# Patient Record
Sex: FEMALE | Race: WHITE | Employment: FULL TIME | URBAN - METROPOLITAN AREA
[De-identification: names, ages, dates, MRNs, and addresses within clinical notes are randomized per-mention and may not be internally consistent; named-entity substitution may affect disease eponyms.]

---

## 2019-02-20 PROBLEM — A60.04 HERPES SIMPLEX VULVOVAGINITIS: Status: ACTIVE | Noted: 2019-02-20

## 2019-05-20 PROBLEM — N92.6 IRREGULAR BLEEDING: Status: ACTIVE | Noted: 2019-05-20

## 2019-05-20 PROBLEM — N83.201 RIGHT OVARIAN CYST: Status: ACTIVE | Noted: 2019-05-20

## 2021-03-05 ENCOUNTER — OFFICE VISIT (OUTPATIENT)
Dept: PRIMARY CARE CLINIC | Age: 26
End: 2021-03-05
Payer: COMMERCIAL

## 2021-03-05 VITALS
TEMPERATURE: 97.4 F | OXYGEN SATURATION: 98 % | DIASTOLIC BLOOD PRESSURE: 74 MMHG | SYSTOLIC BLOOD PRESSURE: 128 MMHG | HEART RATE: 113 BPM | BODY MASS INDEX: 21.9 KG/M2 | WEIGHT: 144 LBS

## 2021-03-05 DIAGNOSIS — F34.1 DYSTHYMIA: ICD-10-CM

## 2021-03-05 DIAGNOSIS — F41.1 GAD (GENERALIZED ANXIETY DISORDER): Primary | ICD-10-CM

## 2021-03-05 PROCEDURE — G8427 DOCREV CUR MEDS BY ELIG CLIN: HCPCS | Performed by: FAMILY MEDICINE

## 2021-03-05 PROCEDURE — 1036F TOBACCO NON-USER: CPT | Performed by: FAMILY MEDICINE

## 2021-03-05 PROCEDURE — G8484 FLU IMMUNIZE NO ADMIN: HCPCS | Performed by: FAMILY MEDICINE

## 2021-03-05 PROCEDURE — G8420 CALC BMI NORM PARAMETERS: HCPCS | Performed by: FAMILY MEDICINE

## 2021-03-05 PROCEDURE — 99203 OFFICE O/P NEW LOW 30 MIN: CPT | Performed by: FAMILY MEDICINE

## 2021-03-05 RX ORDER — DROSPIRENONE, ETHINYL ESTRADIOL AND LEVOMEFOLATE CALCIUM AND LEVOMEFOLATE CALCIUM 3-0.02(24)
KIT ORAL
COMMUNITY
End: 2021-03-17

## 2021-03-05 RX ORDER — CITALOPRAM 20 MG/1
TABLET ORAL
Qty: 30 TABLET | Refills: 0 | Status: SHIPPED
Start: 2021-03-05 | End: 2021-04-05

## 2021-03-05 SDOH — HEALTH STABILITY: MENTAL HEALTH: HOW OFTEN DO YOU HAVE A DRINK CONTAINING ALCOHOL?: 2-4 TIMES A MONTH

## 2021-03-05 SDOH — ECONOMIC STABILITY: TRANSPORTATION INSECURITY
IN THE PAST 12 MONTHS, HAS LACK OF TRANSPORTATION KEPT YOU FROM MEETINGS, WORK, OR FROM GETTING THINGS NEEDED FOR DAILY LIVING?: NOT ASKED

## 2021-03-05 ASSESSMENT — PATIENT HEALTH QUESTIONNAIRE - PHQ9
SUM OF ALL RESPONSES TO PHQ QUESTIONS 1-9: 0
SUM OF ALL RESPONSES TO PHQ QUESTIONS 1-9: 0
2. FEELING DOWN, DEPRESSED OR HOPELESS: 0
1. LITTLE INTEREST OR PLEASURE IN DOING THINGS: 0

## 2021-03-05 NOTE — PROGRESS NOTES
Stu Philippe, a female of 31 y.o. came to the office 3/5/2021. Patient Active Problem List   Diagnosis    Herpes simplex vulvovaginitis    Irregular bleeding    Right ovarian cyst          HPI anxiety: for past 18 months +. Tried different bcp's thinking that maybe cause of some anxiety but it didn't matter. Has done some therapy without much relief. Never on med. No Known Allergies    Current Outpatient Medications on File Prior to Visit   Medication Sig Dispense Refill    Drospiren-Eth Estrad-Levomefol 3-0.02-0.451 MG TABS Take by mouth      valACYclovir (VALTREX) 500 MG tablet Take 1 tablet by mouth 2 times daily 6 tablet 6     No current facility-administered medications on file prior to visit. Review of Systems   Constitutional: Positive for fatigue. Negative for activity change and appetite change. Psychiatric/Behavioral: Positive for agitation, dysphoric mood, sleep disturbance (staying ) and suicidal ideas. Negative for decreased concentration. The patient is nervous/anxious. Guilt: none   other review of systems reviewed and are negative    OBJECTIVE:  /74   Pulse 113   Temp 97.4 °F (36.3 °C)   Wt 144 lb (65.3 kg)   LMP 02/19/2021   SpO2 98%   BMI 21.90 kg/m²      Physical Exam  Constitutional:       General: She is not in acute distress. Eyes:      General: No scleral icterus. Conjunctiva/sclera: Conjunctivae normal.   Neck:      Musculoskeletal: Neck supple. Thyroid: No thyromegaly. Cardiovascular:      Rate and Rhythm: Normal rate and regular rhythm. Heart sounds: No murmur. Pulmonary:      Effort: Pulmonary effort is normal.      Breath sounds: Normal breath sounds. Lymphadenopathy:      Cervical: No cervical adenopathy. Skin:     General: Skin is warm and dry. Neurological:      Mental Status: She is alert and oriented to person, place, and time.          ASSESSMENT AND PLAN:    Richard Crespo was seen today for established new doctor, anxiety and insomnia. Diagnoses and all orders for this visit:    CRISTY (generalized anxiety disorder)  -     citalopram (CELEXA) 20 MG tablet; Take 0.5 tablets by mouth daily for 6 days, THEN 1 tablet daily for 24 days. Dysthymia    - continue exercising and journal ing.  - reviewed labs done from gyne which look ok. Return in about 1 month (around 4/5/2021), or if symptoms worsen or fail to improve.     Cookie Ribeiro, DO

## 2021-04-09 ENCOUNTER — OFFICE VISIT (OUTPATIENT)
Dept: PRIMARY CARE CLINIC | Age: 26
End: 2021-04-09
Payer: COMMERCIAL

## 2021-04-09 VITALS
DIASTOLIC BLOOD PRESSURE: 82 MMHG | TEMPERATURE: 97.7 F | WEIGHT: 141 LBS | HEART RATE: 87 BPM | BODY MASS INDEX: 21.44 KG/M2 | SYSTOLIC BLOOD PRESSURE: 122 MMHG | OXYGEN SATURATION: 98 %

## 2021-04-09 DIAGNOSIS — F34.1 DYSTHYMIA: ICD-10-CM

## 2021-04-09 DIAGNOSIS — F41.1 GAD (GENERALIZED ANXIETY DISORDER): Primary | ICD-10-CM

## 2021-04-09 PROCEDURE — 1036F TOBACCO NON-USER: CPT | Performed by: FAMILY MEDICINE

## 2021-04-09 PROCEDURE — G8420 CALC BMI NORM PARAMETERS: HCPCS | Performed by: FAMILY MEDICINE

## 2021-04-09 PROCEDURE — 99213 OFFICE O/P EST LOW 20 MIN: CPT | Performed by: FAMILY MEDICINE

## 2021-04-09 PROCEDURE — G8427 DOCREV CUR MEDS BY ELIG CLIN: HCPCS | Performed by: FAMILY MEDICINE

## 2021-04-09 RX ORDER — HYDROXYZINE PAMOATE 25 MG/1
25 CAPSULE ORAL 3 TIMES DAILY PRN
Qty: 30 CAPSULE | Refills: 1 | Status: ON HOLD
Start: 2021-04-09 | End: 2021-04-22 | Stop reason: HOSPADM

## 2021-04-09 SDOH — ECONOMIC STABILITY: INCOME INSECURITY: HOW HARD IS IT FOR YOU TO PAY FOR THE VERY BASICS LIKE FOOD, HOUSING, MEDICAL CARE, AND HEATING?: NOT HARD AT ALL

## 2021-04-09 SDOH — ECONOMIC STABILITY: TRANSPORTATION INSECURITY
IN THE PAST 12 MONTHS, HAS LACK OF TRANSPORTATION KEPT YOU FROM MEETINGS, WORK, OR FROM GETTING THINGS NEEDED FOR DAILY LIVING?: NO

## 2021-04-09 SDOH — ECONOMIC STABILITY: TRANSPORTATION INSECURITY
IN THE PAST 12 MONTHS, HAS THE LACK OF TRANSPORTATION KEPT YOU FROM MEDICAL APPOINTMENTS OR FROM GETTING MEDICATIONS?: NO

## 2021-04-09 SDOH — ECONOMIC STABILITY: FOOD INSECURITY: WITHIN THE PAST 12 MONTHS, YOU WORRIED THAT YOUR FOOD WOULD RUN OUT BEFORE YOU GOT MONEY TO BUY MORE.: NEVER TRUE

## 2021-04-09 NOTE — PROGRESS NOTES
Anne Cortez, a female of 31 y.o. came to the office 4/9/2021. Patient Active Problem List   Diagnosis    Herpes simplex vulvovaginitis    Irregular bleeding    Right ovarian cyst    CRISTY (generalized anxiety disorder)    Dysthymia          HPI CRISTY: anxiety not as frequent but when has it, there's more physical ie tight chest, sob. This occurs qod or situational.  Intense sens is 15-20 min's then feels exhausted after. 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 foing through a bad break up 1 month ago. Agitation and sleep better. Not as tearful as before. Feels 60 % better. Depression is much better. No Known Allergies    Current Outpatient Medications on File Prior to Visit   Medication Sig Dispense Refill    citalopram (CELEXA) 20 MG tablet Take 1 tablet by mouth daily 30 tablet 0    Drospiren-Eth Estrad-Levomefol 3-0.02-0.451 MG TABS TAKE 1 TABLET BY MOUTH DAILY 28 tablet 5    valACYclovir (VALTREX) 500 MG tablet Take 1 tablet by mouth 2 times daily 6 tablet 6     No current facility-administered medications on file prior to visit. Review of Systems   Constitutional: Negative for activity change, appetite change and fatigue. Psychiatric/Behavioral: Negative for agitation, decreased concentration, dysphoric mood, sleep disturbance and suicidal ideas. The patient is nervous/anxious. other review of systems reviewed and are negative    OBJECTIVE:  /82   Pulse 87   Temp 97.7 °F (36.5 °C)   Wt 141 lb (64 kg)   SpO2 98%   BMI 21.44 kg/m²      Physical Exam  Constitutional:       General: She is not in acute distress. Appearance: Normal appearance. She is not ill-appearing, toxic-appearing or diaphoretic. HENT:      Head: Normocephalic. Eyes:      General: No scleral icterus. Pulmonary:      Effort: Pulmonary effort is normal.   Neurological:      Mental Status: She is alert and oriented to person, place, and time.    Psychiatric:         Mood and Affect: Mood normal. ASSESSMENT AND PLAN:    Flakita Jenkins was seen today for anxiety. Diagnoses and all orders for this visit:    CRISTY (generalized anxiety disorder)  -     hydrOXYzine (VISTARIL) 25 MG capsule; Take 1 capsule by mouth 3 times daily as needed for Anxiety    Dysthymia    - continue current dose of Celexa  - add Vistaril 25 mg prn for anxiety. - call for refills. Return in about 3 months (around 7/9/2021), or if symptoms worsen or fail to improve.     Neeta Ribeiro, DO [de-identified] : Laterality: Right shoulder\par \par General: Alert and oriented x3.  In no acute distress.  Pleasant in nature with a normal affect.  No apparent respiratory distress. \par Erythema, Warmth, Rubor: Negative\par Swelling: Negative\par \par ROM:\par FF: 140 degrees\par ER: 65 degrees\par Abduction: 130 degrees\par IR: Normal\par IR behind back: L5\par \par Special Test:\par Empty Can Sign: Negative\par [place]'s Sign: Negative\par Mayo/Neer Sign: Negative\par Speed's Sign: Negative\par Yergason's Sign: Negative\par Apprehension/Relocation: Negative\par Sulcus Sign: Negative\par Cross Arm Adduction/Pain over AC-J: Negative\par Belly Press/Lift Off Behind Back: Negative\par \par Neurovascularly intact motor and sensory\par

## 2021-04-14 ENCOUNTER — HOSPITAL ENCOUNTER (INPATIENT)
Age: 26
LOS: 5 days | Discharge: PSYCHIATRIC OR SUBSTANCE USE FACILITY | DRG: 918 | End: 2021-04-19
Attending: EMERGENCY MEDICINE | Admitting: INTERNAL MEDICINE
Payer: COMMERCIAL

## 2021-04-14 ENCOUNTER — APPOINTMENT (OUTPATIENT)
Dept: GENERAL RADIOLOGY | Age: 26
DRG: 918 | End: 2021-04-14
Payer: COMMERCIAL

## 2021-04-14 DIAGNOSIS — T50.902A INTENTIONAL DRUG OVERDOSE, INITIAL ENCOUNTER (HCC): Primary | ICD-10-CM

## 2021-04-14 DIAGNOSIS — F32.2 CURRENT SEVERE EPISODE OF MAJOR DEPRESSIVE DISORDER WITHOUT PSYCHOTIC FEATURES, UNSPECIFIED WHETHER RECURRENT (HCC): ICD-10-CM

## 2021-04-14 PROBLEM — F41.1 GAD (GENERALIZED ANXIETY DISORDER): Chronic | Status: ACTIVE | Noted: 2021-04-09

## 2021-04-14 PROBLEM — F34.1 DYSTHYMIA: Chronic | Status: ACTIVE | Noted: 2021-04-09

## 2021-04-14 PROBLEM — A60.04 HERPES SIMPLEX VULVOVAGINITIS: Chronic | Status: ACTIVE | Noted: 2019-02-20

## 2021-04-14 LAB
ACETAMINOPHEN LEVEL: <5 MCG/ML (ref 10–30)
ACETAMINOPHEN LEVEL: <5 MCG/ML (ref 10–30)
ADENOVIRUS BY PCR: NOT DETECTED
ALBUMIN SERPL-MCNC: 4.2 G/DL (ref 3.5–5.2)
ALP BLD-CCNC: 35 U/L (ref 35–104)
ALT SERPL-CCNC: 12 U/L (ref 0–32)
AMPHETAMINE SCREEN, URINE: NOT DETECTED
ANION GAP SERPL CALCULATED.3IONS-SCNC: 11 MMOL/L (ref 7–16)
AST SERPL-CCNC: 13 U/L (ref 0–31)
BARBITURATE SCREEN URINE: NOT DETECTED
BASOPHILS ABSOLUTE: 0.05 E9/L (ref 0–0.2)
BASOPHILS RELATIVE PERCENT: 0.9 % (ref 0–2)
BENZODIAZEPINE SCREEN, URINE: NOT DETECTED
BILIRUB SERPL-MCNC: <0.2 MG/DL (ref 0–1.2)
BORDETELLA PARAPERTUSSIS BY PCR: NOT DETECTED
BORDETELLA PERTUSSIS BY PCR: NOT DETECTED
BUN BLDV-MCNC: 12 MG/DL (ref 6–20)
CALCIUM SERPL-MCNC: 9.6 MG/DL (ref 8.6–10.2)
CANNABINOID SCREEN URINE: NOT DETECTED
CHLAMYDOPHILIA PNEUMONIAE BY PCR: NOT DETECTED
CHLORIDE BLD-SCNC: 106 MMOL/L (ref 98–107)
CO2: 26 MMOL/L (ref 22–29)
COCAINE METABOLITE SCREEN URINE: NOT DETECTED
CORONAVIRUS 229E BY PCR: NOT DETECTED
CORONAVIRUS HKU1 BY PCR: NOT DETECTED
CORONAVIRUS NL63 BY PCR: NOT DETECTED
CORONAVIRUS OC43 BY PCR: NOT DETECTED
CREAT SERPL-MCNC: 0.9 MG/DL (ref 0.5–1)
EOSINOPHILS ABSOLUTE: 0.11 E9/L (ref 0.05–0.5)
EOSINOPHILS RELATIVE PERCENT: 2 % (ref 0–6)
ETHANOL: <10 MG/DL (ref 0–0.08)
ETHANOL: <10 MG/DL (ref 0–0.08)
FENTANYL SCREEN, URINE: NOT DETECTED
GFR AFRICAN AMERICAN: >60
GFR NON-AFRICAN AMERICAN: >60 ML/MIN/1.73
GLUCOSE BLD-MCNC: 86 MG/DL (ref 74–99)
HCG QUALITATIVE: NEGATIVE
HCT VFR BLD CALC: 34.2 % (ref 34–48)
HEMOGLOBIN: 11.2 G/DL (ref 11.5–15.5)
HUMAN METAPNEUMOVIRUS BY PCR: NOT DETECTED
HUMAN RHINOVIRUS/ENTEROVIRUS BY PCR: NOT DETECTED
IMMATURE GRANULOCYTES #: 0.02 E9/L
IMMATURE GRANULOCYTES %: 0.4 % (ref 0–5)
INFLUENZA A BY PCR: NOT DETECTED
INFLUENZA B BY PCR: NOT DETECTED
LYMPHOCYTES ABSOLUTE: 1.64 E9/L (ref 1.5–4)
LYMPHOCYTES RELATIVE PERCENT: 29.3 % (ref 20–42)
Lab: NORMAL
MAGNESIUM: 1.5 MG/DL (ref 1.6–2.6)
MCH RBC QN AUTO: 30.6 PG (ref 26–35)
MCHC RBC AUTO-ENTMCNC: 32.7 % (ref 32–34.5)
MCV RBC AUTO: 93.4 FL (ref 80–99.9)
METHADONE SCREEN, URINE: NOT DETECTED
MONOCYTES ABSOLUTE: 0.58 E9/L (ref 0.1–0.95)
MONOCYTES RELATIVE PERCENT: 10.4 % (ref 2–12)
MYCOPLASMA PNEUMONIAE BY PCR: NOT DETECTED
NEUTROPHILS ABSOLUTE: 3.2 E9/L (ref 1.8–7.3)
NEUTROPHILS RELATIVE PERCENT: 57 % (ref 43–80)
OPIATE SCREEN URINE: NOT DETECTED
OXYCODONE URINE: NOT DETECTED
PARAINFLUENZA VIRUS 1 BY PCR: NOT DETECTED
PARAINFLUENZA VIRUS 2 BY PCR: NOT DETECTED
PARAINFLUENZA VIRUS 3 BY PCR: NOT DETECTED
PARAINFLUENZA VIRUS 4 BY PCR: NOT DETECTED
PDW BLD-RTO: 12.6 FL (ref 11.5–15)
PHENCYCLIDINE SCREEN URINE: NOT DETECTED
PLATELET # BLD: 336 E9/L (ref 130–450)
PMV BLD AUTO: 9.8 FL (ref 7–12)
POTASSIUM SERPL-SCNC: 3.6 MMOL/L (ref 3.5–5)
RBC # BLD: 3.66 E12/L (ref 3.5–5.5)
RESPIRATORY SYNCYTIAL VIRUS BY PCR: NOT DETECTED
SALICYLATE, SERUM: <0.3 MG/DL (ref 0–30)
SALICYLATE, SERUM: <0.3 MG/DL (ref 0–30)
SARS-COV-2, PCR: NOT DETECTED
SODIUM BLD-SCNC: 143 MMOL/L (ref 132–146)
TOTAL PROTEIN: 6.7 G/DL (ref 6.4–8.3)
TRICYCLIC ANTIDEPRESSANTS SCREEN SERUM: NEGATIVE NG/ML
TRICYCLIC ANTIDEPRESSANTS SCREEN SERUM: NEGATIVE NG/ML
TROPONIN: <0.01 NG/ML (ref 0–0.03)
WBC # BLD: 5.6 E9/L (ref 4.5–11.5)

## 2021-04-14 PROCEDURE — 96366 THER/PROPH/DIAG IV INF ADDON: CPT

## 2021-04-14 PROCEDURE — 71045 X-RAY EXAM CHEST 1 VIEW: CPT

## 2021-04-14 PROCEDURE — 80307 DRUG TEST PRSMV CHEM ANLYZR: CPT

## 2021-04-14 PROCEDURE — 36415 COLL VENOUS BLD VENIPUNCTURE: CPT

## 2021-04-14 PROCEDURE — 84484 ASSAY OF TROPONIN QUANT: CPT

## 2021-04-14 PROCEDURE — G0378 HOSPITAL OBSERVATION PER HR: HCPCS

## 2021-04-14 PROCEDURE — 96375 TX/PRO/DX INJ NEW DRUG ADDON: CPT

## 2021-04-14 PROCEDURE — 80143 DRUG ASSAY ACETAMINOPHEN: CPT

## 2021-04-14 PROCEDURE — 85025 COMPLETE CBC W/AUTO DIFF WBC: CPT

## 2021-04-14 PROCEDURE — 2580000003 HC RX 258: Performed by: INTERNAL MEDICINE

## 2021-04-14 PROCEDURE — 2060000000 HC ICU INTERMEDIATE R&B

## 2021-04-14 PROCEDURE — 80053 COMPREHEN METABOLIC PANEL: CPT

## 2021-04-14 PROCEDURE — 83735 ASSAY OF MAGNESIUM: CPT

## 2021-04-14 PROCEDURE — 80179 DRUG ASSAY SALICYLATE: CPT

## 2021-04-14 PROCEDURE — 84703 CHORIONIC GONADOTROPIN ASSAY: CPT

## 2021-04-14 PROCEDURE — 82077 ASSAY SPEC XCP UR&BREATH IA: CPT

## 2021-04-14 PROCEDURE — 96372 THER/PROPH/DIAG INJ SC/IM: CPT

## 2021-04-14 PROCEDURE — 99285 EMERGENCY DEPT VISIT HI MDM: CPT

## 2021-04-14 PROCEDURE — 96376 TX/PRO/DX INJ SAME DRUG ADON: CPT

## 2021-04-14 PROCEDURE — 6360000002 HC RX W HCPCS: Performed by: INTERNAL MEDICINE

## 2021-04-14 PROCEDURE — 0202U NFCT DS 22 TRGT SARS-COV-2: CPT

## 2021-04-14 PROCEDURE — 2580000003 HC RX 258: Performed by: EMERGENCY MEDICINE

## 2021-04-14 PROCEDURE — 96365 THER/PROPH/DIAG IV INF INIT: CPT

## 2021-04-14 RX ORDER — POTASSIUM CHLORIDE 7.45 MG/ML
10 INJECTION INTRAVENOUS
Status: DISPENSED | OUTPATIENT
Start: 2021-04-14 | End: 2021-04-14

## 2021-04-14 RX ORDER — MAGNESIUM SULFATE IN WATER 40 MG/ML
2000 INJECTION, SOLUTION INTRAVENOUS ONCE
Status: COMPLETED | OUTPATIENT
Start: 2021-04-14 | End: 2021-04-14

## 2021-04-14 RX ORDER — SODIUM CHLORIDE 9 MG/ML
INJECTION, SOLUTION INTRAVENOUS CONTINUOUS
Status: DISCONTINUED | OUTPATIENT
Start: 2021-04-14 | End: 2021-04-17

## 2021-04-14 RX ORDER — SODIUM CHLORIDE 0.9 % (FLUSH) 0.9 %
5-40 SYRINGE (ML) INJECTION EVERY 12 HOURS SCHEDULED
Status: DISCONTINUED | OUTPATIENT
Start: 2021-04-14 | End: 2021-04-19 | Stop reason: HOSPADM

## 2021-04-14 RX ORDER — POLYETHYLENE GLYCOL 3350 17 G/17G
17 POWDER, FOR SOLUTION ORAL DAILY PRN
Status: DISCONTINUED | OUTPATIENT
Start: 2021-04-14 | End: 2021-04-19 | Stop reason: HOSPADM

## 2021-04-14 RX ORDER — SODIUM CHLORIDE 0.9 % (FLUSH) 0.9 %
5-40 SYRINGE (ML) INJECTION PRN
Status: DISCONTINUED | OUTPATIENT
Start: 2021-04-14 | End: 2021-04-19 | Stop reason: HOSPADM

## 2021-04-14 RX ORDER — 0.9 % SODIUM CHLORIDE 0.9 %
1000 INTRAVENOUS SOLUTION INTRAVENOUS ONCE
Status: COMPLETED | OUTPATIENT
Start: 2021-04-14 | End: 2021-04-14

## 2021-04-14 RX ORDER — SODIUM CHLORIDE 9 MG/ML
25 INJECTION, SOLUTION INTRAVENOUS PRN
Status: DISCONTINUED | OUTPATIENT
Start: 2021-04-14 | End: 2021-04-19 | Stop reason: HOSPADM

## 2021-04-14 RX ORDER — ACETAMINOPHEN 325 MG/1
650 TABLET ORAL EVERY 6 HOURS PRN
Status: DISCONTINUED | OUTPATIENT
Start: 2021-04-14 | End: 2021-04-19 | Stop reason: HOSPADM

## 2021-04-14 RX ORDER — ACETAMINOPHEN 650 MG/1
650 SUPPOSITORY RECTAL EVERY 6 HOURS PRN
Status: DISCONTINUED | OUTPATIENT
Start: 2021-04-14 | End: 2021-04-19 | Stop reason: HOSPADM

## 2021-04-14 RX ADMIN — POTASSIUM CHLORIDE 10 MEQ: 10 INJECTION, SOLUTION INTRAVENOUS at 18:28

## 2021-04-14 RX ADMIN — POTASSIUM CHLORIDE 10 MEQ: 10 INJECTION, SOLUTION INTRAVENOUS at 17:11

## 2021-04-14 RX ADMIN — SODIUM CHLORIDE 1000 ML: 9 INJECTION, SOLUTION INTRAVENOUS at 09:11

## 2021-04-14 RX ADMIN — ENOXAPARIN SODIUM 40 MG: 40 INJECTION SUBCUTANEOUS at 15:00

## 2021-04-14 RX ADMIN — Medication 10 ML: at 20:12

## 2021-04-14 RX ADMIN — MAGNESIUM SULFATE HEPTAHYDRATE 2000 MG: 40 INJECTION, SOLUTION INTRAVENOUS at 14:59

## 2021-04-14 RX ADMIN — SODIUM CHLORIDE: 9 INJECTION, SOLUTION INTRAVENOUS at 14:59

## 2021-04-14 ASSESSMENT — PAIN - FUNCTIONAL ASSESSMENT: PAIN_FUNCTIONAL_ASSESSMENT: 0-10

## 2021-04-14 ASSESSMENT — PAIN SCALES - GENERAL: PAINLEVEL_OUTOF10: 0

## 2021-04-14 NOTE — ED NOTES
Patient resting calmly on cart without new complaints. Constant observer at bedside.       Crispin Park RN  04/14/21 [id number]

## 2021-04-14 NOTE — FLOWSHEET NOTE
Patient a new admission to the unit. Patient is a suicide risk, belongings in the unit med room. Patient has two empty prescription bottles. One is Citalopram and the other is her brother's Risperidone. Other bottle is Vistaril for her anxiety. Patient is very sleepy but arousable. IVL #20 R AC.   Continuous sitter at bedside/

## 2021-04-14 NOTE — ED NOTES
Suicide and constant observation explained to the patient and her friend.      Bud López RN  04/14/21 3243

## 2021-04-14 NOTE — ED NOTES
Patient belongings secured in med room. Items reviewed by 2 nurses.       Aurora Bonds RN  04/14/21 4488

## 2021-04-14 NOTE — ED PROVIDER NOTES
HPI:  4/14/21,   Time: 9:15 AM EDT         Jovany Cesar is a 31 y.o. female presenting to the ED for drug overdose on citalopram and Risperdal, beginning approximately 4:30 AM ago. The complaint has been persistent, moderate in severity, and worsened by nothing. The patient's parents are out of town she is here with her friend. Friend relates that she has been stressed out over a number of issues recently including having to move back home from [facility] and she is concerned over Covid among other issues. Patient is cooperative    ROS:   Pertinent positives and negatives are stated within HPI, all other systems reviewed and are negative.  --------------------------------------------- PAST HISTORY ---------------------------------------------  Past Medical History:  has a past medical history of Bell's palsy and Herpes simplex. Past Surgical History:  has a past surgical history that includes [place] tooth extraction and knee surgery. Social History:  reports that she has never smoked. She has never used smokeless tobacco. She reports current alcohol use. She reports that she does not use drugs. Family History: family history includes Other in her brother; Seizures in her brother. The patients home medications have been reviewed. Allergies: Patient has no known allergies.     -------------------------------------------------- RESULTS -------------------------------------------------  All laboratory and radiology results have been personally reviewed by myself   LABS:  Results for orders placed or performed during the hospital encounter of 04/14/21   Respiratory Panel, Molecular, with COVID-19 (Restricted: peds pts or suitable admitted adults)    Specimen: Nasopharyngeal   Result Value Ref Range    Adenovirus by PCR Not Detected Not Detected    Bordetella parapertussis by PCR Not Detected Not Detected    Bordetella pertussis by PCR Not Detected Not Detected    Chlamydophilia pneumoniae by PCR Not Detected Not Detected    Coronavirus 229E by PCR Not Detected Not Detected    Coronavirus HKU1 by PCR Not Detected Not Detected    Coronavirus NL63 by PCR Not Detected Not Detected    Coronavirus OC43 by PCR Not Detected Not Detected    SARS-CoV-2, PCR Not Detected Not Detected    Human Metapneumovirus by PCR Not Detected Not Detected    Human Rhinovirus/Enterovirus by PCR Not Detected Not Detected    Influenza A by PCR Not Detected Not Detected    Influenza B by PCR Not Detected Not Detected    Mycoplasma pneumoniae by PCR Not Detected Not Detected    Parainfluenza Virus 1 by PCR Not Detected Not Detected    Parainfluenza Virus 2 by PCR Not Detected Not Detected    Parainfluenza Virus 3 by PCR Not Detected Not Detected    Parainfluenza Virus 4 by PCR Not Detected Not Detected    Respiratory Syncytial Virus by PCR Not Detected Not Detected   CBC Auto Differential   Result Value Ref Range    WBC 5.6 4.5 - 11.5 E9/L    RBC 3.66 3.50 - 5.50 E12/L    Hemoglobin 11.2 (L) 11.5 - 15.5 g/dL    Hematocrit 34.2 34.0 - 48.0 %    MCV 93.4 80.0 - 99.9 fL    MCH 30.6 26.0 - 35.0 pg    MCHC 32.7 32.0 - 34.5 %    RDW 12.6 11.5 - 15.0 fL    Platelets 968 700 - 949 E9/L    MPV 9.8 7.0 - 12.0 fL    Neutrophils % 57.0 43.0 - 80.0 %    Immature Granulocytes % 0.4 0.0 - 5.0 %    Lymphocytes % 29.3 20.0 - 42.0 %    Monocytes % 10.4 2.0 - 12.0 %    Eosinophils % 2.0 0.0 - 6.0 %    Basophils % 0.9 0.0 - 2.0 %    Neutrophils Absolute 3.20 1.80 - 7.30 E9/L    Immature Granulocytes # 0.02 E9/L    Lymphocytes Absolute 1.64 1.50 - 4.00 E9/L    Monocytes Absolute 0.58 0.10 - 0.95 E9/L    Eosinophils Absolute 0.11 0.05 - 0.50 E9/L    Basophils Absolute 0.05 0.00 - 0.20 E9/L   Comprehensive Metabolic Panel   Result Value Ref Range    Sodium 143 132 - 146 mmol/L    Potassium 3.6 3.5 - 5.0 mmol/L    Chloride 106 98 - 107 mmol/L    CO2 26 22 - 29 mmol/L    Anion Gap 11 7 - 16 mmol/L    Glucose 86 74 - 99 mg/dL    BUN 12 6 - 20 mg/dL    CREATININE 0.9 0.5 - 1.0 mg/dL    GFR Non-African American >60 >=60 mL/min/1.73    GFR African American >60     Calcium 9.6 8.6 - 10.2 mg/dL    Total Protein 6.7 6.4 - 8.3 g/dL    Albumin 4.2 3.5 - 5.2 g/dL    Total Bilirubin <0.2 0.0 - 1.2 mg/dL    Alkaline Phosphatase 35 35 - 104 U/L    ALT 12 0 - 32 U/L    AST 13 0 - 31 U/L   Troponin   Result Value Ref Range    Troponin <0.01 0.00 - 0.03 ng/mL   Serum Drug Screen   Result Value Ref Range    Ethanol Lvl <10 mg/dL    Acetaminophen Level <5.0 (L) 10.0 - 22.9 mcg/mL    Salicylate, Serum <1.6 0.0 - 30.0 mg/dL   HCG Qualitative, Serum   Result Value Ref Range    hCG Qual NEGATIVE NEGATIVE   Magnesium   Result Value Ref Range    Magnesium 1.5 (L) 1.6 - 2.6 mg/dL       RADIOLOGY:  Interpreted by Radiologist.  XR CHEST PORTABLE   Final Result   No acute process. ------------------------- NURSING NOTES AND VITALS REVIEWED ---------------------------   The nursing notes within the ED encounter and vital signs as below have been reviewed. /62   Pulse 70   Temp 97.7 °F (36.5 °C) (Temporal)   Resp 16   Ht 5' 8\" (1.727 m)   Wt 145 lb (65.8 kg)   LMP 03/18/2021 (Approximate)   SpO2 99%   BMI 22.05 kg/m²   Oxygen Saturation Interpretation: Normal      ---------------------------------------------------PHYSICAL EXAM--------------------------------------      Constitutional/General: Awake but sleepy is able to verbalize and respond to verbal   head: NC/AT  Eyes: PERRL, EOMI  Mouth: Oropharynx clear, handling secretions, no trismus  Neck: Supple, full ROM, no meningeal signs  Pulmonary: Lungs clear to auscultation bilaterally, no wheezes, rales, or rhonchi. Not in respiratory distress  Cardiovascular:  Regular rate and rhythm, no murmurs, gallops, or rubs. 2+ distal pulses  Abdomen: Soft, non tender, non distended,   Extremities: Moves all extremities x 4.  Warm and well perfused  Skin: warm and dry without rash  Neurologic: GCS 15, awake moving all extremities no focal deficits. Extraocular muscles are intact  Psych: Affect flat appears depressed      ------------------------------ ED COURSE/MEDICAL DECISION MAKING----------------------  Medications   0.9 % sodium chloride bolus (0 mLs Intravenous Stopped 4/14/21 1103)         Medical Decision Making: Will check basic lab work including urine and serum drug screens. We will consult poison control. Patient will need to be admitted to monitored bed for 24 hours and will need psychiatric consult        An extensive consultation with poison control regarding this patient and her overdoses. Celexa has a half-life of 30 to 36 hours and concern for prolonged QT and the patient at this time does have borderline and prolonged QT so patient will need to be monitored at least for today or possibly 2 days. Consult internal medicine, Dr. Shashank Hook is covering he accepts the patient for admission to [facility] intermediate bed. Patient was reexamined at approximately 11:30 AM and is awake but sleepy verbalizing response to verbal vital signs are stable heart rates in the 70s blood pressure is good no respiratory distress. Awaiting transfer to [facility]. Please note that the withdrawal or failure to initiate urgent interventions for this patient would likely result in a life threatening deterioration or permanent disability. Accordingly this patient received 0 minutes of critical care time, excluding separately billable procedures. Counseling: The emergency provider has spoken with the patient and discussed todays results, in addition to providing specific details for the plan of care and counseling regarding the diagnosis and prognosis. Questions are answered at this time and they are agreeable with the plan.      --------------------------------- IMPRESSION AND DISPOSITION ---------------------------------    IMPRESSION  1. Intentional drug overdose, initial encounter ([facility]    2.  Current severe episode of major depressive disorder without psychotic features, unspecified whether recurrent ([facility] [address] [facility]        DISPOSITION  Disposition: Admit to telemetry/intermediate at [facility]  Patient condition is serious                  Claudia Cox MD  04/14/21 8630

## 2021-04-14 NOTE — H&P
[address]                  [address] [facility],  [address]                              HISTORY AND PHYSICAL    PATIENT NAME: Dave Kat                    :        1995  MED REC NO:   [id number]                            ROOM:       5410  ACCOUNT NO:   [de-identified]                           ADMIT DATE: 2021  PROVIDER:     Rena Cutler DO    PRIMARY CARE PHYSICIAN:  Qu[place]. CHIEF COMPLAINT:  Drug overdose. HISTORY OF PRESENT ILLNESS:  The patient is a 69-year-old   female who presented to the emergency room after she took an unknown  amount of Celexa, hydroxyzine, and Risperdal.  She was seen in the  emergency room and admitted for further evaluation and treatment. PAST MEDICAL HISTORY:  Anxiety disorder, dysthymia, herpes simplex  vulvovaginitis. MEDICATIONS PRIOR TO ADMISSION:  Vistaril, Celexa, birth control pill,  Valtrex. REVIEW OF SYSTEMS:  Remarkable for above-stated chief complaint. ALLERGIES:  None known. SOCIAL HISTORY:  No tobacco.  Occasional alcohol. PAST SURGICAL HISTORY:  [place] tooth extraction, knee surgery. PHYSICAL EXAMINATION:  GENERAL APPEARANCE:  Physical exam reveals a 69-year-old   female who is responsive to verbal stimuli, cooperative and a poor  historian. She is lethargic. VITAL SIGNS:  On admission, temperature 97.5, pulse 98, respirations 14,  blood pressure 112/74. HEAD:  Normocephalic. Atraumatic. EYES:  Pupils equal and reactive to light. Extraocular muscles intact. Fundi not well visualized. NOSE:  No obstruction, polyp, or discharge noted. MOUTH:  Mucosa without lesion. Pharynx noninjected with exudate. NECK:  Supple. No JVD. No thyromegaly. No carotid bruits. HEART:  Regular rate and rhythm without murmur. LUNGS:  Clear to auscultation bilaterally. ABDOMEN:  Positive bowel sounds. Soft, nontender. No rebound. No  guarding.   No hepatosplenomegaly. No masses. BACK:  Intact without lesion. EXTREMITIES:  Without edema. LYMPH NODES:  No adenopathy noted. SKIN:  Without rash or lesion. IMPRESSION:  Intentional drug overdose, history of generalized anxiety  disorder, dysthymia, herpes simplex vulvovaginitis. PLAN:  Admit. Replace K. Replace magnesium. Suicide precautions. IV  hydration. Psychiatry to see. Serum and urine toxicology screen. Cardiac telemetry. Discharge plan to home versus inpatient psychiatric  unit as per the patient's progress.         Reanna Romano DO    D: 04/14/2021 14:39:17       T: 04/14/2021 14:46:37     [id number]  Job#: [id number]     Doc#: [id number]    CC:

## 2021-04-15 PROBLEM — F32.A DEPRESSION WITH SUICIDAL IDEATION: Status: ACTIVE | Noted: 2021-04-09

## 2021-04-15 PROBLEM — R45.851 DEPRESSION WITH SUICIDAL IDEATION: Status: ACTIVE | Noted: 2021-04-09

## 2021-04-15 LAB
ALBUMIN SERPL-MCNC: 3.6 G/DL (ref 3.5–5.2)
ALP BLD-CCNC: 27 U/L (ref 35–104)
ALT SERPL-CCNC: 8 U/L (ref 0–32)
ANION GAP SERPL CALCULATED.3IONS-SCNC: 6 MMOL/L (ref 7–16)
AST SERPL-CCNC: 10 U/L (ref 0–31)
BILIRUB SERPL-MCNC: 0.4 MG/DL (ref 0–1.2)
BUN BLDV-MCNC: 7 MG/DL (ref 6–20)
CALCIUM SERPL-MCNC: 8.2 MG/DL (ref 8.6–10.2)
CHLORIDE BLD-SCNC: 112 MMOL/L (ref 98–107)
CO2: 23 MMOL/L (ref 22–29)
CREAT SERPL-MCNC: 0.7 MG/DL (ref 0.5–1)
GFR AFRICAN AMERICAN: >60
GFR NON-AFRICAN AMERICAN: >60 ML/MIN/1.73
GLUCOSE BLD-MCNC: 80 MG/DL (ref 74–99)
HCT VFR BLD CALC: 31.9 % (ref 34–48)
HEMOGLOBIN: 10 G/DL (ref 11.5–15.5)
MAGNESIUM: 2 MG/DL (ref 1.6–2.6)
MCH RBC QN AUTO: 30.5 PG (ref 26–35)
MCHC RBC AUTO-ENTMCNC: 31.3 % (ref 32–34.5)
MCV RBC AUTO: 97.3 FL (ref 80–99.9)
PDW BLD-RTO: 12.9 FL (ref 11.5–15)
PLATELET # BLD: 275 E9/L (ref 130–450)
PMV BLD AUTO: 9.9 FL (ref 7–12)
POTASSIUM SERPL-SCNC: 4.4 MMOL/L (ref 3.5–5)
RBC # BLD: 3.28 E12/L (ref 3.5–5.5)
SODIUM BLD-SCNC: 141 MMOL/L (ref 132–146)
TOTAL PROTEIN: 5.4 G/DL (ref 6.4–8.3)
WBC # BLD: 5 E9/L (ref 4.5–11.5)

## 2021-04-15 PROCEDURE — 83735 ASSAY OF MAGNESIUM: CPT

## 2021-04-15 PROCEDURE — 6370000000 HC RX 637 (ALT 250 FOR IP): Performed by: INTERNAL MEDICINE

## 2021-04-15 PROCEDURE — G0378 HOSPITAL OBSERVATION PER HR: HCPCS

## 2021-04-15 PROCEDURE — 6360000002 HC RX W HCPCS: Performed by: INTERNAL MEDICINE

## 2021-04-15 PROCEDURE — 99231 SBSQ HOSP IP/OBS SF/LOW 25: CPT | Performed by: NURSE PRACTITIONER

## 2021-04-15 PROCEDURE — 2060000000 HC ICU INTERMEDIATE R&B

## 2021-04-15 PROCEDURE — 96372 THER/PROPH/DIAG INJ SC/IM: CPT

## 2021-04-15 PROCEDURE — 2580000003 HC RX 258: Performed by: INTERNAL MEDICINE

## 2021-04-15 PROCEDURE — 80053 COMPREHEN METABOLIC PANEL: CPT

## 2021-04-15 PROCEDURE — 85027 COMPLETE CBC AUTOMATED: CPT

## 2021-04-15 PROCEDURE — 36415 COLL VENOUS BLD VENIPUNCTURE: CPT

## 2021-04-15 RX ORDER — CETIRIZINE HYDROCHLORIDE 10 MG/1
10 TABLET ORAL DAILY
Status: DISCONTINUED | OUTPATIENT
Start: 2021-04-15 | End: 2021-04-19 | Stop reason: HOSPADM

## 2021-04-15 RX ADMIN — Medication 10 ML: at 20:01

## 2021-04-15 RX ADMIN — CETIRIZINE HYDROCHLORIDE 10 MG: 10 TABLET, FILM COATED ORAL at 15:20

## 2021-04-15 RX ADMIN — Medication 10 ML: at 12:58

## 2021-04-15 RX ADMIN — ENOXAPARIN SODIUM 40 MG: 40 INJECTION SUBCUTANEOUS at 12:02

## 2021-04-15 RX ADMIN — SODIUM CHLORIDE 25 ML: 9 INJECTION, SOLUTION INTRAVENOUS at 13:29

## 2021-04-15 ASSESSMENT — PAIN SCALES - GENERAL
PAINLEVEL_OUTOF10: 0
PAINLEVEL_OUTOF10: 0

## 2021-04-15 NOTE — CONSULTS
Reason for consult: \"Suicidal\"    Consulting Physician: Dr Osmel Gomez      HPI:   Greg Coyle is a 31 y.o. female presenting to the ED for drug overdose on citalopram and Risperdal, beginning approximately 4:30 AM ago. The complaint has been persistent, moderate in severity, and worsened by nothing. The patient's parents are out of town she is here with her friend. Friend relates that she has been stressed out over a number of issues recently including having to move back home from [facility] and she is concerned over Covid among other issues. ( End ED note)    Upon assessment today the patient is bright and energetic. She verbally consents to speak with me. That she has never had a suicide attempt before in the past feeling that this was an impulsive act. She is able to tell me that she has been through some stressful times lately. And that she is experience stress in the past but yara acted out impulsively before. He denies auditory or visual hallucinations there is no overt or covert signs of psychosis or paranoia. 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 She denies a history of cutting, denies feeling easily abandoned. Tells me that her PCP has been managing her depression in the community. She endorses that she took an unknown quantity of her or her medications, with intent to harm herself. She states that she no longer feels suicidal.  She is agreeable to come to [facility] upon discharge for psychiatric intervention and treatment. MSE:   Mental status examination reveals a 29-year-old  female in hospital attire with average hygiene and average grooming is superficially forthcoming and cooperative for assessment. Psychomotor reveals no agitation retardation or abnormal posture.   Speech is clear, well articulated she is able to answer questions with relevance and there is no delayed or long latency of response. Eye contact is good throughout assessment. Mood is \"depressed. \"  Affect is bright, energetic and incongruent with stated mood. Thought process is linear and goal-directed there is no looseness of associations there are no flight of ideas. Thought content is devoid of auditory or visual hallucinations there are no overt or covert signs of psychosis or paranoia. She states that she is no longer feeling suicidal however endorses that she was suicidal when she took the overdose. Devoid of homicidal ideation intent or plan. Cognitive function appears to be below baseline due to illness. Memory testing not completed during this encounter. Insight judgment and impulse control are poor. She is alert oriented person place time and situation and able to recount events leading to her hospitalization. DX:     Depression with suicidal ideation    Plan/Recommendations: The patient case, plan of care and recommendations has been discussed with Dr Chele Álvarez and the collaborative psychiatric care team.     Patient to come to [facility] once medically stabilized. Please call [facility] at extension [phone] for admission once patient is medically stable. Thank you for the consult, call with questions.

## 2021-04-15 NOTE — PLAN OF CARE
Problem: Skin Integrity:  Goal: Will show no infection signs and symptoms  Description: Will show no infection signs and symptoms  Outcome: Met This Shift  Goal: Absence of new skin breakdown  Description: Absence of new skin breakdown  Outcome: Met This Shift     Problem: Suicide risk  Goal: Provide patient with safe environment  Description: Provide patient with safe environment  Outcome: Met This Shift

## 2021-04-15 NOTE — CARE COORDINATION
Pt reports from home, independent of all ADL; verified PCP and pharmacy ([facility] [address]); reports no history of O/P treatment or counseling, no history of drug or alcohol abuse however, I did inform pt that peer recovery was notified d/t the intentional drug overdose. Discharge plan is to return home when medically stable.

## 2021-04-15 NOTE — PROGRESS NOTES
04/14/2021    CREATININE 0.9 04/14/2021    GFRAA >60 04/14/2021    LABGLOM >60 04/14/2021    LABGLOM >90 02/29/2016    GLUCOSE 86 04/14/2021    PROT 6.7 04/14/2021    LABALBU 4.2 04/14/2021    CALCIUM 9.6 04/14/2021    BILITOT <0.2 04/14/2021    ALKPHOS 35 04/14/2021    AST 13 04/14/2021    ALT 12 04/14/2021     Warfarin PT/INR:  No results found for: INR, PROTIME    Assessment:    Active Problems:    Herpes simplex vulvovaginitis    CRISTY (generalized anxiety disorder)    Dysthymia    Drug overdose, intentional self-harm, initial encounter ([facility] [address]  Resolved Problems:    * No resolved hospital problems.  *  prolonged QT    Plan:  Cont cardiac telemetry await am lab psych to see repeat ekg in am        Heather Velez  6:42 AM  4/15/2021

## 2021-04-16 LAB
EKG ATRIAL RATE: 71 BPM
EKG P AXIS: 57 DEGREES
EKG P-R INTERVAL: 150 MS
EKG Q-T INTERVAL: 438 MS
EKG QRS DURATION: 70 MS
EKG QTC CALCULATION (BAZETT): 475 MS
EKG R AXIS: 86 DEGREES
EKG T AXIS: 51 DEGREES
EKG VENTRICULAR RATE: 71 BPM

## 2021-04-16 PROCEDURE — 2060000000 HC ICU INTERMEDIATE R&B

## 2021-04-16 PROCEDURE — 6360000002 HC RX W HCPCS: Performed by: INTERNAL MEDICINE

## 2021-04-16 PROCEDURE — 93010 ELECTROCARDIOGRAM REPORT: CPT | Performed by: INTERNAL MEDICINE

## 2021-04-16 PROCEDURE — 96372 THER/PROPH/DIAG INJ SC/IM: CPT

## 2021-04-16 PROCEDURE — 93005 ELECTROCARDIOGRAM TRACING: CPT | Performed by: INTERNAL MEDICINE

## 2021-04-16 PROCEDURE — G0378 HOSPITAL OBSERVATION PER HR: HCPCS

## 2021-04-16 PROCEDURE — 2580000003 HC RX 258: Performed by: INTERNAL MEDICINE

## 2021-04-16 PROCEDURE — 6370000000 HC RX 637 (ALT 250 FOR IP): Performed by: INTERNAL MEDICINE

## 2021-04-16 RX ORDER — ACETAMINOPHEN 325 MG/1
650 TABLET ORAL EVERY 6 HOURS PRN
Status: CANCELLED | OUTPATIENT
Start: 2021-04-16

## 2021-04-16 RX ORDER — CETIRIZINE HYDROCHLORIDE 10 MG/1
10 TABLET ORAL DAILY PRN
Status: CANCELLED | OUTPATIENT
Start: 2021-04-16

## 2021-04-16 RX ORDER — ACETAMINOPHEN 650 MG/1
650 SUPPOSITORY RECTAL EVERY 6 HOURS PRN
Status: CANCELLED | OUTPATIENT
Start: 2021-04-16

## 2021-04-16 RX ORDER — POLYETHYLENE GLYCOL 3350 17 G/17G
17 POWDER, FOR SOLUTION ORAL DAILY PRN
Status: CANCELLED | OUTPATIENT
Start: 2021-04-16

## 2021-04-16 RX ADMIN — Medication 10 ML: at 09:57

## 2021-04-16 RX ADMIN — ENOXAPARIN SODIUM 40 MG: 40 INJECTION SUBCUTANEOUS at 09:07

## 2021-04-16 RX ADMIN — SODIUM CHLORIDE: 9 INJECTION, SOLUTION INTRAVENOUS at 02:19

## 2021-04-16 RX ADMIN — CETIRIZINE HYDROCHLORIDE 10 MG: 10 TABLET, FILM COATED ORAL at 09:37

## 2021-04-16 ASSESSMENT — PAIN SCALES - GENERAL: PAINLEVEL_OUTOF10: 0

## 2021-04-16 NOTE — CARE COORDINATION
This note will not be viewable in [facility] for the following reason(s). Suspected substance abuse disorder. Peer Recovery Support Note    Name: Shawn David  Date: 4/16/2021    Chief Complaint   Patient presents with    Drug Overdose     Took overdose at 430 am. citalopram and risperidone       Peer Support met with patient.   [x] Support and education provided  [x] Resources provided   [x] Treatment referral: [facility]  [] Other:   [] Patient declined peer recovery services     Referred By: Rosy Heller ()  Notes:     Signed: Geraldo Guzmán, 4/16/2021

## 2021-04-16 NOTE — ED NOTES
[facility] aware of patient need for psych bed. SW discussed case with Psych NP John Medina. Patient to be pink slipped for safety/stabilization.      Jarrod Sosa, MSW, LSW  04/16/21 6739

## 2021-04-16 NOTE — PROGRESS NOTES
Hospital Medicine    Subjective:  Pt more alert conversive today    Current Facility-Administered Medications:     cetirizine (ZYRTEC) tablet 10 mg, 10 mg, Oral, Daily, Clent Evelyn Malmer, DO, 10 mg at 04/16/21 2638    sodium chloride flush 0.9 % injection 5-40 mL, 5-40 mL, Intravenous, 2 times per day, Wilsey Reap, DO, 10 mL at 04/16/21 0957    sodium chloride flush 0.9 % injection 5-40 mL, 5-40 mL, Intravenous, PRN, Clent Evelyn Malmer, DO    0.9 % sodium chloride infusion, 25 mL, Intravenous, PRN, Clent Evelyn Malmer, DO, Last Rate: 100 mL/hr at 04/15/21 1329, 25 mL at 04/15/21 1329    enoxaparin (LOVENOX) injection 40 mg, 40 mg, Subcutaneous, Daily, Clent Evelyn Malmer, DO, 40 mg at 04/16/21 0907    polyethylene glycol (GLYCOLAX) packet 17 g, 17 g, Oral, Daily PRN, Clent Evelyn Malmer, DO    acetaminophen (TYLENOL) tablet 650 mg, 650 mg, Oral, Q6H PRN **OR** acetaminophen (TYLENOL) suppository 650 mg, 650 mg, Rectal, Q6H PRN, Clent Evelyn Malmer, DO    0.9 % sodium chloride infusion, , Intravenous, Continuous, Clent Evelyn Malmer, DO, Last Rate: 100 mL/hr at 04/16/21 0219, New Bag at 04/16/21 0219    Objective:    /67   Pulse 67   Temp 97.4 °F (36.3 °C) (Temporal)   Resp 18   Ht 5' 8\" (1.727 m)   Wt 145 lb (65.8 kg)   LMP 03/18/2021 (Approximate)   SpO2 99%   BMI 22.05 kg/m²     Heart:  reg  Lungs:  ctab  Abd: + bs soft nontender                         Extrem:  W/o edema    CBC with Differential:    Lab Results   Component Value Date    WBC 5.0 04/15/2021    RBC 3.28 04/15/2021    RBC 3.23 03/03/2021    HGB 10.0 04/15/2021    HCT 31.9 04/15/2021     04/15/2021    MCV 97.3 04/15/2021    MCH 30.5 04/15/2021    MCHC 31.3 04/15/2021    RDW 12.9 04/15/2021    NRBC 0 02/29/2016    SEGSPCT 60.0 02/29/2016    LYMPHOPCT 29.3 04/14/2021    LYMPHOPCT 12.1 03/03/2021    MONOPCT 10.4 04/14/2021    EOSPCT 0.5 03/03/2021    BASOPCT 0.9 04/14/2021    MONOSABS 0.58 04/14/2021    LYMPHSABS 1.64 04/14/2021 EOSABS 0.11 04/14/2021    BASOSABS 0.05 04/14/2021     CMP:    Lab Results   Component Value Date     04/15/2021    K 4.4 04/15/2021     04/15/2021    CO2 23 04/15/2021    BUN 7 04/15/2021    CREATININE 0.7 04/15/2021    GFRAA >60 04/15/2021    LABGLOM >60 04/15/2021    LABGLOM >90 02/29/2016    GLUCOSE 80 04/15/2021    PROT 5.4 04/15/2021    LABALBU 3.6 04/15/2021    CALCIUM 8.2 04/15/2021    BILITOT 0.4 04/15/2021    ALKPHOS 27 04/15/2021    AST 10 04/15/2021    ALT 8 04/15/2021     Warfarin PT/INR:  No results found for: INR, PROTIME    Assessment:    Active Problems:    Herpes simplex vulvovaginitis    CRISTY (generalized anxiety disorder)    Depression with suicidal ideation    Drug overdose, intentional self-harm, initial encounter ([facility] [address]  Resolved Problems:    * No resolved hospital problems.  *  ekg reviewed    Plan:  Pt is medically stable for transfer to [facility] psych        Nick Muss  12:45 PM  4/16/2021

## 2021-04-17 LAB
ANION GAP SERPL CALCULATED.3IONS-SCNC: 11 MMOL/L (ref 7–16)
BUN BLDV-MCNC: 5 MG/DL (ref 6–20)
CALCIUM SERPL-MCNC: 9.1 MG/DL (ref 8.6–10.2)
CHLORIDE BLD-SCNC: 104 MMOL/L (ref 98–107)
CO2: 24 MMOL/L (ref 22–29)
CREAT SERPL-MCNC: 0.8 MG/DL (ref 0.5–1)
GFR AFRICAN AMERICAN: >60
GFR NON-AFRICAN AMERICAN: >60 ML/MIN/1.73
GLUCOSE BLD-MCNC: 87 MG/DL (ref 74–99)
HCT VFR BLD CALC: 35.1 % (ref 34–48)
HEMOGLOBIN: 11.3 G/DL (ref 11.5–15.5)
MAGNESIUM: 1.9 MG/DL (ref 1.6–2.6)
MCH RBC QN AUTO: 30.2 PG (ref 26–35)
MCHC RBC AUTO-ENTMCNC: 32.2 % (ref 32–34.5)
MCV RBC AUTO: 93.9 FL (ref 80–99.9)
PDW BLD-RTO: 12.3 FL (ref 11.5–15)
PLATELET # BLD: 303 E9/L (ref 130–450)
PMV BLD AUTO: 9.9 FL (ref 7–12)
POTASSIUM SERPL-SCNC: 4.4 MMOL/L (ref 3.5–5)
RBC # BLD: 3.74 E12/L (ref 3.5–5.5)
SODIUM BLD-SCNC: 139 MMOL/L (ref 132–146)
WBC # BLD: 4.4 E9/L (ref 4.5–11.5)

## 2021-04-17 PROCEDURE — G0378 HOSPITAL OBSERVATION PER HR: HCPCS

## 2021-04-17 PROCEDURE — 6370000000 HC RX 637 (ALT 250 FOR IP): Performed by: INTERNAL MEDICINE

## 2021-04-17 PROCEDURE — 93005 ELECTROCARDIOGRAM TRACING: CPT | Performed by: INTERNAL MEDICINE

## 2021-04-17 PROCEDURE — 36415 COLL VENOUS BLD VENIPUNCTURE: CPT

## 2021-04-17 PROCEDURE — 2060000000 HC ICU INTERMEDIATE R&B

## 2021-04-17 PROCEDURE — 80048 BASIC METABOLIC PNL TOTAL CA: CPT

## 2021-04-17 PROCEDURE — 85027 COMPLETE CBC AUTOMATED: CPT

## 2021-04-17 PROCEDURE — 83735 ASSAY OF MAGNESIUM: CPT

## 2021-04-17 RX ORDER — LEVONORGESTREL AND ETHINYL ESTRADIOL 0.15-0.03
1 KIT ORAL DAILY
Status: DISCONTINUED | OUTPATIENT
Start: 2021-04-17 | End: 2021-04-19 | Stop reason: HOSPADM

## 2021-04-17 RX ADMIN — LEVONORGESTREL AND ETHINYL ESTRADIOL 1 TABLET: KIT at 14:12

## 2021-04-17 RX ADMIN — CETIRIZINE HYDROCHLORIDE 10 MG: 10 TABLET, FILM COATED ORAL at 09:36

## 2021-04-17 ASSESSMENT — PAIN SCALES - GENERAL
PAINLEVEL_OUTOF10: 0
PAINLEVEL_OUTOF10: 0

## 2021-04-17 NOTE — PROGRESS NOTES
[facility] Medicine    Subjective:  Pt alert conversive abbe diet      Current Facility-Administered Medications:     cetirizine (ZYRTEC) tablet 10 mg, 10 mg, Oral, Daily, Efe Alvarado, , 10 mg at 04/16/21 7744    sodium chloride flush 0.9 % injection 5-40 mL, 5-40 mL, Intravenous, 2 times per day, Efe Alvarado, DO, 10 mL at 04/16/21 0957    sodium chloride flush 0.9 % injection 5-40 mL, 5-40 mL, Intravenous, PRN, Efe Alvarado, DO    0.9 % sodium chloride infusion, 25 mL, Intravenous, PRN, Efe Alvarado, DO, Last Rate: 100 mL/hr at 04/15/21 1329, 25 mL at 04/15/21 1329    enoxaparin (LOVENOX) injection 40 mg, 40 mg, Subcutaneous, Daily, Efe Alvarado, DO, 40 mg at 04/16/21 0907    polyethylene glycol (GLYCOLAX) packet 17 g, 17 g, Oral, Daily PRN, Efe Alvarado, DO    acetaminophen (TYLENOL) tablet 650 mg, 650 mg, Oral, Q6H PRN **OR** acetaminophen (TYLENOL) suppository 650 mg, 650 mg, Rectal, Q6H PRN, Efe Alvarado DO    Objective:    /64   Pulse 66   Temp 98.3 °F (36.8 °C) (Infrared)   Resp 16   Ht 5' 8\" (1.727 m)   Wt 145 lb (65.8 kg)   LMP 03/18/2021 (Approximate)   SpO2 98%   BMI 22.05 kg/m²     Heart:  reg  Lungs:  ctab  Abd: + bs soft nontender  Extrem:  W/o edema    CBC with Differential:    Lab Results   Component Value Date    WBC 4.4 04/17/2021    RBC 3.74 04/17/2021    RBC 3.23 03/03/2021    HGB 11.3 04/17/2021    HCT 35.1 04/17/2021     04/17/2021    MCV 93.9 04/17/2021    MCH 30.2 04/17/2021    MCHC 32.2 04/17/2021    RDW 12.3 04/17/2021    NRBC 0 02/29/2016    SEGSPCT 60.0 02/29/2016    LYMPHOPCT 29.3 04/14/2021    LYMPHOPCT 12.1 03/03/2021    MONOPCT 10.4 04/14/2021    EOSPCT 0.5 03/03/2021    BASOPCT 0.9 04/14/2021    MONOSABS 0.58 04/14/2021    LYMPHSABS 1.64 04/14/2021    EOSABS 0.11 04/14/2021    BASOSABS 0.05 04/14/2021     CMP:    Lab Results   Component Value Date     04/17/2021    K 4.4 04/17/2021     04/17/2021    CO2 24

## 2021-04-17 NOTE — FLOWSHEET NOTE
Patient seen for morning assessment. Pleasant and cooperative, voices no complaints, no SI/HI at this time. Will continue to monitor. Updated patient that she is medically cleared for psych and we are currently waiting on bed assignment.

## 2021-04-18 PROCEDURE — 2060000000 HC ICU INTERMEDIATE R&B

## 2021-04-18 PROCEDURE — 6370000000 HC RX 637 (ALT 250 FOR IP): Performed by: INTERNAL MEDICINE

## 2021-04-18 PROCEDURE — 2580000003 HC RX 258: Performed by: INTERNAL MEDICINE

## 2021-04-18 PROCEDURE — G0378 HOSPITAL OBSERVATION PER HR: HCPCS

## 2021-04-18 RX ADMIN — CETIRIZINE HYDROCHLORIDE 10 MG: 10 TABLET, FILM COATED ORAL at 08:32

## 2021-04-18 RX ADMIN — LEVONORGESTREL AND ETHINYL ESTRADIOL 1 TABLET: KIT at 08:32

## 2021-04-18 RX ADMIN — Medication 10 ML: at 08:32

## 2021-04-18 ASSESSMENT — PAIN SCALES - GENERAL
PAINLEVEL_OUTOF10: 0

## 2021-04-18 NOTE — PROGRESS NOTES
04/14/2021    BASOSABS 0.05 04/14/2021     CMP:    Lab Results   Component Value Date     04/17/2021    K 4.4 04/17/2021     04/17/2021    CO2 24 04/17/2021    BUN 5 04/17/2021    CREATININE 0.8 04/17/2021    GFRAA >60 04/17/2021    LABGLOM >60 04/17/2021    LABGLOM >90 02/29/2016    GLUCOSE 87 04/17/2021    PROT 5.4 04/15/2021    LABALBU 3.6 04/15/2021    CALCIUM 9.1 04/17/2021    BILITOT 0.4 04/15/2021    ALKPHOS 27 04/15/2021    AST 10 04/15/2021    ALT 8 04/15/2021     Warfarin PT/INR:  No results found for: INR, PROTIME    Assessment:    Active Problems:    Herpes simplex vulvovaginitis    CRISTY (generalized anxiety disorder)    Depression with suicidal ideation    Drug overdose, intentional self-harm, initial encounter ([facility] [address]  Resolved Problems:    * No resolved hospital problems.  *      Plan:  Medically stable / further care per psych        Kailey Puentes  7:33 AM  4/18/2021

## 2021-04-19 ENCOUNTER — HOSPITAL ENCOUNTER (INPATIENT)
Age: 26
LOS: 3 days | Discharge: HOME | DRG: 885 | End: 2021-04-22
Attending: PSYCHIATRY & NEUROLOGY | Admitting: PSYCHIATRY & NEUROLOGY
Payer: COMMERCIAL

## 2021-04-19 VITALS
BODY MASS INDEX: 21.98 KG/M2 | DIASTOLIC BLOOD PRESSURE: 66 MMHG | TEMPERATURE: 97.6 F | SYSTOLIC BLOOD PRESSURE: 139 MMHG | HEART RATE: 59 BPM | HEIGHT: 68 IN | WEIGHT: 145 LBS | RESPIRATION RATE: 16 BRPM | OXYGEN SATURATION: 98 %

## 2021-04-19 DIAGNOSIS — F41.1 GAD (GENERALIZED ANXIETY DISORDER): ICD-10-CM

## 2021-04-19 LAB
EKG ATRIAL RATE: 74 BPM
EKG ATRIAL RATE: 84 BPM
EKG ATRIAL RATE: 89 BPM
EKG P AXIS: 56 DEGREES
EKG P AXIS: 70 DEGREES
EKG P AXIS: 79 DEGREES
EKG P-R INTERVAL: 144 MS
EKG P-R INTERVAL: 146 MS
EKG P-R INTERVAL: 156 MS
EKG Q-T INTERVAL: 382 MS
EKG Q-T INTERVAL: 424 MS
EKG Q-T INTERVAL: 438 MS
EKG QRS DURATION: 72 MS
EKG QRS DURATION: 74 MS
EKG QRS DURATION: 76 MS
EKG QTC CALCULATION (BAZETT): 464 MS
EKG QTC CALCULATION (BAZETT): 486 MS
EKG QTC CALCULATION (BAZETT): 501 MS
EKG R AXIS: 82 DEGREES
EKG R AXIS: 85 DEGREES
EKG R AXIS: 87 DEGREES
EKG T AXIS: 44 DEGREES
EKG T AXIS: 49 DEGREES
EKG T AXIS: 53 DEGREES
EKG VENTRICULAR RATE: 74 BPM
EKG VENTRICULAR RATE: 84 BPM
EKG VENTRICULAR RATE: 89 BPM

## 2021-04-19 PROCEDURE — G0378 HOSPITAL OBSERVATION PER HR: HCPCS

## 2021-04-19 PROCEDURE — 6370000000 HC RX 637 (ALT 250 FOR IP): Performed by: NURSE PRACTITIONER

## 2021-04-19 PROCEDURE — 1240000000 HC EMOTIONAL WELLNESS R&B

## 2021-04-19 PROCEDURE — 6370000000 HC RX 637 (ALT 250 FOR IP): Performed by: INTERNAL MEDICINE

## 2021-04-19 RX ORDER — ACETAMINOPHEN 650 MG/1
650 SUPPOSITORY RECTAL EVERY 6 HOURS PRN
Status: DISCONTINUED | OUTPATIENT
Start: 2021-04-19 | End: 2021-04-22 | Stop reason: HOSPADM

## 2021-04-19 RX ORDER — ACETAMINOPHEN 325 MG/1
650 TABLET ORAL EVERY 6 HOURS PRN
Status: DISCONTINUED | OUTPATIENT
Start: 2021-04-19 | End: 2021-04-22 | Stop reason: HOSPADM

## 2021-04-19 RX ORDER — HALOPERIDOL 5 MG/ML
5 INJECTION INTRAMUSCULAR EVERY 6 HOURS PRN
Status: DISCONTINUED | OUTPATIENT
Start: 2021-04-19 | End: 2021-04-22 | Stop reason: HOSPADM

## 2021-04-19 RX ORDER — ACETAMINOPHEN 325 MG/1
650 TABLET ORAL EVERY 6 HOURS PRN
Status: DISCONTINUED | OUTPATIENT
Start: 2021-04-19 | End: 2021-04-19 | Stop reason: SDUPTHER

## 2021-04-19 RX ORDER — NICOTINE 21 MG/24HR
1 PATCH, TRANSDERMAL 24 HOURS TRANSDERMAL DAILY
Status: DISCONTINUED | OUTPATIENT
Start: 2021-04-20 | End: 2021-04-22 | Stop reason: HOSPADM

## 2021-04-19 RX ORDER — CETIRIZINE HYDROCHLORIDE 10 MG/1
10 TABLET ORAL DAILY PRN
Status: DISCONTINUED | OUTPATIENT
Start: 2021-04-19 | End: 2021-04-22 | Stop reason: HOSPADM

## 2021-04-19 RX ORDER — POLYETHYLENE GLYCOL 3350 17 G/17G
17 POWDER, FOR SOLUTION ORAL DAILY PRN
Status: DISCONTINUED | OUTPATIENT
Start: 2021-04-19 | End: 2021-04-22 | Stop reason: HOSPADM

## 2021-04-19 RX ORDER — TRAZODONE HYDROCHLORIDE 50 MG/1
50 TABLET ORAL NIGHTLY PRN
Status: DISCONTINUED | OUTPATIENT
Start: 2021-04-19 | End: 2021-04-22 | Stop reason: HOSPADM

## 2021-04-19 RX ORDER — MAGNESIUM HYDROXIDE/ALUMINUM HYDROXICE/SIMETHICONE 120; 1200; 1200 MG/30ML; MG/30ML; MG/30ML
30 SUSPENSION ORAL PRN
Status: DISCONTINUED | OUTPATIENT
Start: 2021-04-19 | End: 2021-04-22 | Stop reason: HOSPADM

## 2021-04-19 RX ORDER — HALOPERIDOL 5 MG
5 TABLET ORAL EVERY 6 HOURS PRN
Status: DISCONTINUED | OUTPATIENT
Start: 2021-04-19 | End: 2021-04-22 | Stop reason: HOSPADM

## 2021-04-19 RX ORDER — HYDROXYZINE PAMOATE 50 MG/1
50 CAPSULE ORAL 3 TIMES DAILY PRN
Status: DISCONTINUED | OUTPATIENT
Start: 2021-04-19 | End: 2021-04-22 | Stop reason: HOSPADM

## 2021-04-19 RX ADMIN — TRAZODONE HYDROCHLORIDE 50 MG: 50 TABLET ORAL at 21:37

## 2021-04-19 RX ADMIN — HYDROXYZINE PAMOATE 50 MG: 50 CAPSULE ORAL at 21:37

## 2021-04-19 RX ADMIN — LEVONORGESTREL AND ETHINYL ESTRADIOL 1 TABLET: KIT at 07:53

## 2021-04-19 RX ADMIN — CETIRIZINE HYDROCHLORIDE 10 MG: 10 TABLET, FILM COATED ORAL at 07:50

## 2021-04-19 ASSESSMENT — PAIN SCALES - GENERAL
PAINLEVEL_OUTOF10: 0

## 2021-04-19 ASSESSMENT — SLEEP AND FATIGUE QUESTIONNAIRES
DIFFICULTY ARISING: NO
DIFFICULTY FALLING ASLEEP: YES
RESTFUL SLEEP: NO
DO YOU HAVE DIFFICULTY SLEEPING: YES

## 2021-04-19 NOTE — PROGRESS NOTES
`[facility]  Admission Note     Patient transferred from 75. Patient intentionally overdosed on Risperdal and Celexa in an attempt to end her life. Patient reports feeling stressed and anxious due to a recent break up, feeling isolated from [facility], and drinking. Patient acknowledges that she is an alcoholic and is interested in outpatient treatment at discharge. Patient reports she is a binge drinker, consuming large amounts of beer and liquor 3-4 nights per week. Patient expresses remorse for her suicide attempt and reports that her family has been very supportive. Patient attributes her impulsive actions to her alcohol abuse. Patient denies attempting suicide in the past and has never been admitted to an inpatient psychiatric unit before. Patient also does not receive psychiatric treatment in the community. Patient pleasant, calm and cooperative throughout interview process. Patient currently denies SI/HI/Hallucinations. Admission Type:   Admission Type:  Involuntary    Reason for admission:  Reason for Admission: \"I overdosed on my antidepressant\"    PATIENT STRENGTHS:  Strengths: Communication, Motivated, Employment, Positive Support, Medication Compliance    Patient Strengths and Limitations:  Limitations: Inappropriate/potentially harmful leisure interests    Addictive Behavior:   Addictive Behavior  In the past 3 months, have you felt or has someone told you that you have a problem with:  : None  Do you have a history of Chemical Use?: No  Do you have a history of Alcohol Use?: Yes  Do you have a history of Street Drug Abuse?: No  Histroy of Prescripton Drug Abuse?: No    Medical Problems:   Past Medical History:   Diagnosis Date    Bell's palsy     21 yo    Herpes simplex     2       Status EXAM:  Status and Exam  Normal: Yes  Facial Expression: Brightened  Affect: Congruent  Level of Consciousness: Alert  Mood:Normal: No  Mood: Anxious  Motor Activity:Normal: Yes  Interview Behavior: Cooperative  Preception: Shell to Person, Shell to Time, Shell to Place, Shell to Situation  Attention:Normal: Yes  Thought Content:Normal: No  Thought Content: Preoccupations  Hallucinations: None  Delusions: No  Memory:Normal: Yes  Insight and Judgment: No  Insight and Judgment: Poor Judgment  Present Suicidal Ideation: No  Present Homicidal Ideation: No    Tobacco Screening:  Practical Counseling, on admission, gina X, if applicable and completed (first 3 are required if patient doesn't refuse):            (X)  Recognizing danger situations (included triggers and roadblocks)                    (X)  Coping skills (new ways to manage stress, exercise, relaxation techniques, changing routine, distraction)                                                           (X)  Basic information about quitting (benefits of quitting, techniques in how to quit, available resources  (X) Referral for counseling faxed to [facility]                                           ( ) Patient refused counseling  ( ) Patient has not smoked in the last 30 days    Metabolic Screening:    No results found for: LABA1C    No results found for: CHOL  No results found for: TRIG  No results found for: HDL  No components found for: LDLCAL  No results found for: LABVLDL      Body mass index is 21.48 kg/m². BP Readings from Last 2 Encounters:   04/19/21 (!) 140/81   04/19/21 139/66           Pt admitted with followings belongings:  Dentures: None  Vision - Corrective Lenses: Glasses  Hearing Aid: None  Jewelry: Other (Comment)(Facial piercings)  Body Piercings Removed: No      Patient oriented to surroundings and program expectations and copy of patient rights given. Received admission packet:  Yes. Consents reviewed, signed Yes. Patient verbalize understanding:  Yes. Patient education on precautions:  Yes                   Rogelio Wilder RN

## 2021-04-19 NOTE — PLAN OF CARE
Problem: Skin Integrity:  Goal: Will show no infection signs and symptoms  Description: Will show no infection signs and symptoms  Outcome: Met This Shift  Goal: Absence of new skin breakdown  Description: Absence of new skin breakdown  Outcome: Met This Shift     Problem: Suicide risk  Goal: Provide patient with safe environment  Description: Provide patient with safe environment  4/19/2021 1331 by Jessica Montano RN  Outcome: Met This Shift  4/19/2021 0457 by Alonso Lua RN  Outcome: Met This Shift

## 2021-04-19 NOTE — PROGRESS NOTES
[facility] Medicine    Subjective:  Pt alert conversive cooperative      Current Facility-Administered Medications:     levonorgestrel-ethinyl estradiol (NORDETTE) 0.15-30 MG-MCG per tablet 1 tablet, 1 tablet, Oral, Daily, Chi Curling Malmer, DO, 1 tablet at 04/19/21 0753    cetirizine (ZYRTEC) tablet 10 mg, 10 mg, Oral, Daily, Chi Curling Malmer, DO, 10 mg at 04/19/21 0750    sodium chloride flush 0.9 % injection 5-40 mL, 5-40 mL, Intravenous, 2 times per day, Chi Curling Malmer, DO, 10 mL at 04/18/21 0370    sodium chloride flush 0.9 % injection 5-40 mL, 5-40 mL, Intravenous, PRN, Chi Curling Malmer, DO    0.9 % sodium chloride infusion, 25 mL, Intravenous, PRN, Chi Curling Malmer, DO, Last Rate: 100 mL/hr at 04/15/21 1329, 25 mL at 04/15/21 1329    enoxaparin (LOVENOX) injection 40 mg, 40 mg, Subcutaneous, Daily, Chi Curling Malmer, DO, 40 mg at 04/16/21 0907    polyethylene glycol (GLYCOLAX) packet 17 g, 17 g, Oral, Daily PRN, Chi Curling Malmer, DO    acetaminophen (TYLENOL) tablet 650 mg, 650 mg, Oral, Q6H PRN **OR** acetaminophen (TYLENOL) suppository 650 mg, 650 mg, Rectal, Q6H PRN, Chi Curling Malmer, DO    Objective:    /71   Pulse 84   Temp 98.3 °F (36.8 °C) (Temporal)   Resp 16   Ht 5' 8\" (1.727 m)   Wt 145 lb (65.8 kg)   LMP 03/18/2021 (Approximate)   SpO2 99%   BMI 22.05 kg/m²     Heart:  reg  Lungs:  ctab  Abd: + bs soft nontender  Extrem:  W/o edema    CBC with Differential:    Lab Results   Component Value Date    WBC 4.4 04/17/2021    RBC 3.74 04/17/2021    RBC 3.23 03/03/2021    HGB 11.3 04/17/2021    HCT 35.1 04/17/2021     04/17/2021    MCV 93.9 04/17/2021    MCH 30.2 04/17/2021    MCHC 32.2 04/17/2021    RDW 12.3 04/17/2021    NRBC 0 02/29/2016    SEGSPCT 60.0 02/29/2016    LYMPHOPCT 29.3 04/14/2021    LYMPHOPCT 12.1 03/03/2021    MONOPCT 10.4 04/14/2021    EOSPCT 0.5 03/03/2021    BASOPCT 0.9 04/14/2021    MONOSABS 0.58 04/14/2021    LYMPHSABS 1.64 04/14/2021    EOSABS 0.11 04/14/2021    BASOSABS 0.05 04/14/2021     CMP:    Lab Results   Component Value Date     04/17/2021    K 4.4 04/17/2021     04/17/2021    CO2 24 04/17/2021    BUN 5 04/17/2021    CREATININE 0.8 04/17/2021    GFRAA >60 04/17/2021    LABGLOM >60 04/17/2021    LABGLOM >90 02/29/2016    GLUCOSE 87 04/17/2021    PROT 5.4 04/15/2021    LABALBU 3.6 04/15/2021    CALCIUM 9.1 04/17/2021    BILITOT 0.4 04/15/2021    ALKPHOS 27 04/15/2021    AST 10 04/15/2021    ALT 8 04/15/2021     Warfarin PT/INR:  No results found for: INR, PROTIME    Assessment:    Active Problems:    Herpes simplex vulvovaginitis    CRISTY (generalized anxiety disorder)    Depression with suicidal ideation    Drug overdose, intentional self-harm, initial encounter ([facility] [address]  Resolved Problems:    * No resolved hospital problems.  *      Plan:  Dc to psych        Jairon Slade  8:20 AM  4/19/2021

## 2021-04-20 PROBLEM — F10.10 ALCOHOL ABUSE: Status: ACTIVE | Noted: 2021-04-20

## 2021-04-20 PROBLEM — F32.2 CURRENT SEVERE EPISODE OF MAJOR DEPRESSIVE DISORDER WITHOUT PSYCHOTIC FEATURES WITHOUT PRIOR EPISODE (HCC): Status: ACTIVE | Noted: 2021-04-20

## 2021-04-20 LAB
CHOLESTEROL, TOTAL: 185 MG/DL (ref 0–199)
HBA1C MFR BLD: 4.6 % (ref 4–5.6)
HDLC SERPL-MCNC: 58 MG/DL
LDL CHOLESTEROL CALCULATED: 93 MG/DL (ref 0–99)
TRIGL SERPL-MCNC: 168 MG/DL (ref 0–149)
VLDLC SERPL CALC-MCNC: 34 MG/DL

## 2021-04-20 PROCEDURE — 1240000000 HC EMOTIONAL WELLNESS R&B

## 2021-04-20 PROCEDURE — 6370000000 HC RX 637 (ALT 250 FOR IP): Performed by: PSYCHIATRY & NEUROLOGY

## 2021-04-20 PROCEDURE — 36415 COLL VENOUS BLD VENIPUNCTURE: CPT

## 2021-04-20 PROCEDURE — 6370000000 HC RX 637 (ALT 250 FOR IP): Performed by: NURSE PRACTITIONER

## 2021-04-20 PROCEDURE — 80061 LIPID PANEL: CPT

## 2021-04-20 PROCEDURE — 83036 HEMOGLOBIN GLYCOSYLATED A1C: CPT

## 2021-04-20 PROCEDURE — 99222 1ST HOSP IP/OBS MODERATE 55: CPT | Performed by: NURSE PRACTITIONER

## 2021-04-20 RX ORDER — BUSPIRONE HYDROCHLORIDE 5 MG/1
5 TABLET ORAL 3 TIMES DAILY
Status: DISCONTINUED | OUTPATIENT
Start: 2021-04-20 | End: 2021-04-22 | Stop reason: HOSPADM

## 2021-04-20 RX ORDER — CITALOPRAM 20 MG/1
20 TABLET ORAL DAILY
Status: DISCONTINUED | OUTPATIENT
Start: 2021-04-20 | End: 2021-04-22 | Stop reason: HOSPADM

## 2021-04-20 RX ORDER — LEVONORGESTREL AND ETHINYL ESTRADIOL 0.15-0.03
1 KIT ORAL DAILY
Status: DISCONTINUED | OUTPATIENT
Start: 2021-04-20 | End: 2021-04-20

## 2021-04-20 RX ORDER — LEVONORGESTREL AND ETHINYL ESTRADIOL 0.15-0.03
1 KIT ORAL DAILY
Status: DISCONTINUED | OUTPATIENT
Start: 2021-04-20 | End: 2021-04-22 | Stop reason: HOSPADM

## 2021-04-20 RX ADMIN — BUSPIRONE HYDROCHLORIDE 5 MG: 5 TABLET ORAL at 16:52

## 2021-04-20 RX ADMIN — TRAZODONE HYDROCHLORIDE 50 MG: 50 TABLET ORAL at 21:01

## 2021-04-20 RX ADMIN — LEVONORGESTREL AND ETHINYL ESTRADIOL 1 TABLET: KIT at 11:35

## 2021-04-20 RX ADMIN — CITALOPRAM HYDROBROMIDE 20 MG: 20 TABLET ORAL at 16:53

## 2021-04-20 RX ADMIN — BUSPIRONE HYDROCHLORIDE 5 MG: 5 TABLET ORAL at 21:01

## 2021-04-20 ASSESSMENT — SLEEP AND FATIGUE QUESTIONNAIRES
DO YOU USE A SLEEP AID: NO
DIFFICULTY ARISING: NO
DIFFICULTY FALLING ASLEEP: YES
AVERAGE NUMBER OF SLEEP HOURS: 6
SLEEP PATTERN: DIFFICULTY FALLING ASLEEP
DO YOU HAVE DIFFICULTY SLEEPING: YES

## 2021-04-20 ASSESSMENT — PAIN SCALES - GENERAL
PAINLEVEL_OUTOF10: 0

## 2021-04-20 NOTE — GROUP NOTE
Group Therapy Note    Date: 4/20/2021    Group Start Time: 1000  Group End Time: 2003  Group Topic: Psychoeducation    [facility]        Group Therapy Note    Attendees: 15         Patient's Goal:  Feel healthier and happier. Notes: Active and engaged during discussion on building new habits. Pleasant in sharing experiences with peers. Able to identify new skills to apply today. Status After Intervention:  Improved    Participation Level:  Active Listener and Interactive    Participation Quality: Appropriate, Attentive and Sharing      Speech:  normal      Thought Process/Content: Logical      Affective Functioning: Congruent      Mood: euthymic      Level of consciousness:  Alert and Attentive      Response to Learning: Capable of insight, Able to change behavior and Progressing to goal      Endings: None Reported    Modes of Intervention: Education, Support, Socialization and Exploration      Discipline Responsible: Psychoeducational Specialist      Signature:  Jose Luis Grady

## 2021-04-20 NOTE — PLAN OF CARE
Problem: Depressive Behavior With or Without Suicide Precautions:  Goal: Able to verbalize and/or display a decrease in depressive symptoms  Description: Able to verbalize and/or display a decrease in depressive symptoms  Outcome: Met This Shift     Problem: Depressive Behavior With or Without Suicide Precautions:  Goal: Ability to disclose and discuss suicidal ideas will improve  Description: Ability to disclose and discuss suicidal ideas will improve  Outcome: Met This Shift     Problem: Depressive Behavior With or Without Suicide Precautions:  Goal: Able to verbalize support systems  Description: Able to verbalize support systems  4/20/2021 0824 by Mine Carpenter RN  Outcome: Met This Shift  4/19/2021 2229 by Virgilio Galvan RN  Outcome: Met This Shift     Problem: Depressive Behavior With or Without Suicide Precautions:  Goal: Absence of self-harm  Description: Absence of self-harm  Outcome: Met This Shift

## 2021-04-20 NOTE — H&P
Department of Psychiatry  History and Physical - Adult     CHIEF COMPLAINT:  Intentional overdose    Patient was seen after discussing with the treatment team and reviewing the chart    CIRCUMSTANCES OF ADMISSION:   Followed in psychiatric consult services for intentional overdose, pink slipped and admitted to [facility] after medical clearance. HISTORY OF PRESENT ILLNESS:      The patient is a 31 y.o. female with significant past history of depression and anxiety, who presented to [facility] emergency department for intentional drug overdose on citalopram and Risperdal.  Patient states she is prescribed the citalopram but could not offer explanation as to how she obtained respite all. Patient was seen through psychiatric consult services, once medically cleared she was admitted to [facility] for psychiatric evaluation and stabilization. Upon assessment today the patient is bright and energetic. She verbally consents to speak with me. That she has never had a suicide attempt before in the past feeling that this was an impulsive act. She is able to tell me that she has been through some stressful times lately. And that she has experienced stress in the past but yara acted out impulsively before. He denies auditory or visual hallucinations there is no overt or covert signs of psychosis or paranoia. States that usually her mood is okay, however she has difficulty with feeling anxious most of the time, and depressed. She describes her depression as feeling overwhelmed, low energy, low mood. She states her anxiety is a persistent problem and that she worries about everything even without a reason to worry. 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 She denies a history of cutting, denies feeling easily abandoned. Tells me that her PCP has been managing her depression in the community.   She endorses that she took an unknown quantity of her or her medications, with intent to harm herself. She states that she no longer feels suicidal.  She is agreeable to the treatment plan. Medications and plan of care have been discussed in depth with the patient, she has been provided the opportunity to ask questions. Past psychiatric history: Patient has history of major depressive disorder, CRISTY, was treating with her PCP on citalopram, which she states works well for her. 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 MSE:  Mental status examination reveals a 25-year-old  female in hospital attire with average hygiene and average grooming is superficially forthcoming and cooperative for assessment. Psychomotor reveals no agitation retardation or abnormal posture. Speech is clear, well articulated she is able to answer questions with relevance and there is no delayed or long latency of response. Eye contact is good throughout assessment. Mood is \"depressed. \"  Affect is bright, energetic and incongruent with stated mood. Thought process is linear and goal-directed there is no looseness of associations there are no flight of ideas. Thought content is devoid of auditory or visual hallucinations there are no overt or covert signs of psychosis or paranoia. She states that she is no longer feeling suicidal however endorses that she was suicidal when she took the overdose. Devoid of homicidal ideation intent or plan. Cognitive function appears to be below baseline due to illness. Memory testing not completed during this encounter. Insight judgment and impulse control are poor.   She is alert oriented person place time and situation and able to recount events leading to her hospitalization. Past Medical History:        Diagnosis Date    Bell's palsy     21 yo    Herpes simplex     2       Medications Prior to Admission:   Medications Prior to Admission: hydrOXYzine (VISTARIL) 25 MG capsule, Take 1 capsule by mouth 3 times daily as needed for Anxiety  citalopram (CELEXA) 20 MG tablet, Take 1 tablet by mouth daily  Drospiren-Eth Estrad-Levomefol 3-0.02-0.451 MG TABS, TAKE 1 TABLET BY MOUTH DAILY    Past Surgical History:        Procedure Laterality Date    KNEE SURGERY      left    WISDOM TOOTH EXTRACTION         Allergies:   Patient has no known allergies. Family History  Family History   Problem Relation Age of Onset    Other Brother         autism    Seizures Brother              EXAMINATION:    REVIEW OF SYSTEMS:    ROS:  [x] All negative/unchanged except if checked.  Explain positive(checked items) below:  [] Constitutional  [] Eyes  [] Ear/Nose/Mouth/Throat  [] Respiratory  [] CV  [] GI  []   [] Musculoskeletal  [] Skin/Breast  [] Neurological  [] Endocrine  [] Heme/Lymph  [] Allergic/Immunologic    Explanation:     Vitals:  /68   Pulse 72   Temp 98.4 °F (36.9 °C) (Temporal)   Resp 14   Ht 5' 8\" (1.727 m)   Wt 141 lb 4.8 oz (64.1 kg)   SpO2 100%   BMI 21.48 kg/m²      Physical Examination:   Head: x  Atraumatic: x normocephalic  Skin and Mucosa        Moist x  Dry   Pale  x Normal   Neck:  Thyroid  Palpable   x  Not palpable   venus distention   adenopathy   Chest: x Clear   Rhonchi     Wheezing   CV:  xS1   xS2    xNo murmer   Abdomen:  x  Soft    Tender    Viceromegaly   Extremities:  x No Edema     Edema     Cranial Nerves Examination:   CN II:   xPupils are reactive to light  Pupils are non reactive to light  CN III, IV, VI:  xNo eye deviation    No diplopia or ptosis   CN V:    xFacial Sensation is intact     Facial Sensation is not intact   CN IIIV:   x Hearing is normal to rubbing fingers   CN IX, X:     xNormal gag reflex and phonation   CN XI: xShoulder shrug and neck rotation is normal  CNXII:    xTongue is midline no deviation or atrophy    DIAGNOSIS:    Current severe episode of major depressive disorder without psychotic features without prior episode (HCC)  Generalized anxiety disorder  Alcohol abuse     LABS: REVIEWED TODAY:  No results for input(s): WBC, HGB, PLT in the last 72 hours. No results for input(s): NA, K, CL, CO2, BUN, CREATININE, GLUCOSE in the last 72 hours. No results for input(s): BILITOT, ALKPHOS, AST, ALT in the last 72 hours. Lab Results   Component Value Date    LABAMPH NOT DETECTED 04/14/2021    BARBSCNU NOT DETECTED 04/14/2021    LABBENZ NOT DETECTED 04/14/2021    LABMETH NOT DETECTED 04/14/2021    OPIATESCREENURINE NOT DETECTED 04/14/2021    PHENCYCLIDINESCREENURINE NOT DETECTED 04/14/2021    ETOH <10 04/14/2021     Lab Results   Component Value Date    TSH 0.98 03/03/2021     No results found for: LITHIUM  No results found for: VALPROATE, CBMZ  No results found for: LITHIUM, VALPROATE      Radiology Xr Chest Portable    Result Date: 4/14/2021  EXAMINATION: ONE XRAY VIEW OF THE CHEST 4/14/2021 9:10 am COMPARISON: None. HISTORY: ORDERING SYSTEM PROVIDED HISTORY: od TECHNOLOGIST PROVIDED HISTORY: Reason for exam:->od FINDINGS: The lungs are without acute focal process. There is no effusion or pneumothorax. The cardiomediastinal silhouette is without acute process. The osseous structures are without acute process. No acute process. TREATMENT PLAN:  The patient's diagnosis, treatment plan, medication management were formulated after patient was seen directly by the attending physician and myself and all relevant documentation was reviewed. Risk Management: Based on the diagnosis and assessment biopsychosocial treatment model was presented to the patient and was given the opportunity to ask any question.   The patient was agreeable to the plan and all the patient's questions were answered to the patient's satisfaction. I discussed with the patient the risk, benefit, alternative and common side effects for the proposed medication treatment. The patient is consenting to this treatment. The patient was referred to outpatient/inpatient substance abuse rehabilitation programming. She was educated multiple times during the hospitalization that if she chooses to continue to use drugs or alcohol, she may act out impulsively, resulting in serious harm to self or others even though unintentional.  She was also educated that mental health treatment cannot be optimized with ongoing use of drugs or alcohol she demonstrated understanding and has the capacity to understand that. Collateral Information:  Will obtain collateral information from the family or friends. Will obtain medical records as appropriate from out patient providers  Will consult the hospitalist for a physical exam to rule out any co-morbid physical condition. Home medication Reconciled       New Medications started during this admission :    Citalopram 20 mg daily for depression  BuSpar 5 mg TID for anxiety    Prn Haldol 5mg and Vistaril 50mg q6hr for extreme agitation. Trazodone as ordered for insomnia  Vistaril as ordered for anxiety      Psychotherapy:   Encourage participation in milieu and group therapy  Individual therapy as needed              Behavioral Services  Medicare Certification Upon Admission    I certify that this patient's inpatient psychiatric hospital admission is medically necessary for:    [x] (1) Treatment which could reasonably be expected to improve this patient's condition,       [x] (2) Or for diagnostic study;     AND     [x](2) The inpatient psychiatric services are provided while the individual is under the care of a physician and are included in the individualized plan of care.     Estimated length of stay/service 3 - 5 days     Plan for post-hospital care follow up with OP provider    Electronically signed by Gina Bonilla Aure Argueta CNP on 4/20/2021 at 8:10 AM

## 2021-04-20 NOTE — GROUP NOTE
Group Therapy Note    Date: 4/20/2021    Group Start Time: 1505  Group End Time: 1666  Group Topic: Cognitive Skills    [facility]    CLARISSE Tai LSW        Group Therapy Note    Attendees: 8         Patient's Goal:  Pt will be able to verbalize understanding of self-care, and it's importance    Notes:  Pt participated in group and made connections    Status After Intervention:  Improved    Participation Level:  Active Listener    Participation Quality: Appropriate, Attentive, Sharing and Supportive      Speech:  normal      Thought Process/Content: Logical      Affective Functioning: Congruent      Mood: depressed      Level of consciousness:  Alert and Oriented x4      Response to Learning: Able to verbalize current knowledge/experience      Endings: None Reported    Modes of Intervention: Education, Support, Socialization, Exploration, Clarifying, Problem-solving and Activity      Discipline Responsible: /Counselor      Signature:  CLARISSE Tai LSW

## 2021-04-20 NOTE — PROGRESS NOTES
[facility]  Initial Interdisciplinary Treatment Plan NOTE    Review Date & Time: 4/20/21 0900    Patient was in treatment team    Admission Type:   Admission Type: Involuntary    Reason for admission:  Reason for Admission: \"I overdosed on my antidepressant\"      Estimated Length of Stay Update:  1-3 days  Estimated Discharge Date Update: 4/22/21    PATIENT STRENGTHS:  Patient Strengths Strengths: Communication, Motivated, Employment, Positive Support, Medication Compliance  Patient Strengths and Limitations:Limitations: Inappropriate/potentially harmful leisure interests  Addictive Behavior:Addictive Behavior  In the past 3 months, have you felt or has someone told you that you have a problem with:  : None  Do you have a history of Chemical Use?: No  Do you have a history of Alcohol Use?: Yes  Do you have a history of Street Drug Abuse?: No  Histroy of Prescripton Drug Abuse?: No  Medical Problems:  Past Medical History:   Diagnosis Date    Bell's palsy     21 yo    Herpes simplex     2       EDUCATION:   Learner Progress Toward Treatment Goals: Reviewed results and recommendations of this team and Reviewed goals and plan of care    Method: Small group    Outcome: Verbalized understanding    PATIENT GOALS: \"Get happy and healthy. Work on outpatient treatment\"     PLAN/TREATMENT RECOMMENDATIONS UPDATE: Take prescribed medications, attend/participate in groups. Continue to provide emotional support to patient.     GOALS UPDATE:   Time frame for Short-Term Goals: 1-3 days    Spencer Carcamo RN

## 2021-04-21 PROCEDURE — 6370000000 HC RX 637 (ALT 250 FOR IP): Performed by: PSYCHIATRY & NEUROLOGY

## 2021-04-21 PROCEDURE — 1240000000 HC EMOTIONAL WELLNESS R&B

## 2021-04-21 PROCEDURE — 99231 SBSQ HOSP IP/OBS SF/LOW 25: CPT | Performed by: NURSE PRACTITIONER

## 2021-04-21 PROCEDURE — 6370000000 HC RX 637 (ALT 250 FOR IP): Performed by: NURSE PRACTITIONER

## 2021-04-21 RX ADMIN — BUSPIRONE HYDROCHLORIDE 5 MG: 5 TABLET ORAL at 09:57

## 2021-04-21 RX ADMIN — TRAZODONE HYDROCHLORIDE 50 MG: 50 TABLET ORAL at 21:12

## 2021-04-21 RX ADMIN — BUSPIRONE HYDROCHLORIDE 5 MG: 5 TABLET ORAL at 13:34

## 2021-04-21 RX ADMIN — CITALOPRAM HYDROBROMIDE 20 MG: 20 TABLET ORAL at 09:57

## 2021-04-21 RX ADMIN — LEVONORGESTREL AND ETHINYL ESTRADIOL 1 TABLET: KIT at 09:57

## 2021-04-21 RX ADMIN — BUSPIRONE HYDROCHLORIDE 5 MG: 5 TABLET ORAL at 21:12

## 2021-04-21 ASSESSMENT — PAIN SCALES - GENERAL
PAINLEVEL_OUTOF10: 0
PAINLEVEL_OUTOF10: 0

## 2021-04-21 NOTE — PROGRESS NOTES
BEHAVIORAL HEALTH FOLLOW-UP NOTE     4/21/2021     Patient was seen and examined in person, Chart reviewed   Patient's case discussed with staff/team    Chief Complaint: \"I am feeling much better. \"    Interim History:   Patient is observed in the day room this morning, she is bright she is social.  She is compliant with her medications attending groups and visible in the milieu. Her speech is well articulated she is able to answer questions with relevance there is no delayed or late in response. She denies auditory or visual hallucinations or no overt or covert signs of psychosis or paranoia. She did denies suicidal or homicidal ideation intent or plan maintains good eye contact throughout assessment. She is an active participant in her treatment and discharge planning.           Appetite:   [x] Normal/Unchanged  [] Increased  [] Decreased      Sleep:       [x] Normal/Unchanged  [] Fair       [] Poor              Energy:    [x] Normal/Unchanged  [] Increased  [] Decreased        SI [] Present  [x] Absent    HI  []Present  [x] Absent     Aggression:  [] yes  [x] no    Patient is [x] able  [] unable to CONTRACT FOR SAFETY     PAST MEDICAL/PSYCHIATRIC HISTORY:   Past Medical History:   Diagnosis Date    Bell's palsy     23 yo    Herpes simplex     2       FAMILY/SOCIAL HISTORY:  Family History   Problem Relation Age of Onset    Other Brother         autism    Seizures Brother      Social History     Socioeconomic History    Marital status: Single     Spouse name: Not on file    Number of children: Not on file    Years of education: Not on file    Highest education level: Not on file   Occupational History    Occupation: marketing coodinator for [employer]   Social Needs    Financial resource strain: Not hard at all   [place] insecurity     Worry: Never true     Inability: Never true    Transportation needs     Medical: No     Non-medical: No   Tobacco Use    Smoking status: Never Smoker    Smokeless tobacco: Never Used   Substance and Sexual Activity    Alcohol use: Yes     Frequency: 2-4 times a month     Drinks per session: 1 or 2     Comment: social    Drug use: No    Sexual activity: Yes     Partners: Male     Birth control/protection: Condom   Lifestyle    Physical activity     Days per week: Not on file     Minutes per session: Not on file    Stress: Not on file   Relationships    Social connections     Talks on phone: Not on file     Gets together: Not on file     Attends Lutheran service: Not on file     Active member of club or organization: Not on file     Attends meetings of clubs or organizations: Not on file     Relationship status: Not on file    Intimate partner violence     Fear of current or ex partner: Not on file     Emotionally abused: Not on file     Physically abused: Not on file     Forced sexual activity: Not on file   Other Topics Concern    Not on file   Social History Narrative    ** Merged History Encounter **                ROS:  [x] All negative/unchanged except if checked.  Explain positive(checked items) below:  [] Constitutional  [] Eyes  [] Ear/Nose/Mouth/Throat  [] Respiratory  [] CV  [] GI  []   [] Musculoskeletal  [] Skin/Breast  [] Neurological  [] Endocrine  [] Heme/Lymph  [] Allergic/Immunologic    Explanation:     MEDICATIONS:    Current Facility-Administered Medications:     levonorgestrel-ethinyl estradiol (NORDETTE) 0.15-30 MG-MCG per tablet 1 tablet, 1 tablet, Oral, Daily, Tristan Pennington MD, 1 tablet at 04/21/21 0957    citalopram (CELEXA) tablet 20 mg, 20 mg, Oral, Daily, ANDRES Jay CNP, 20 mg at 04/21/21 0957    busPIRone (BUSPAR) tablet 5 mg, 5 mg, Oral, TID, ANDRES Jay CNP, 5 mg at 04/21/21 1334    acetaminophen (TYLENOL) tablet 650 mg, 650 mg, Oral, Q6H PRN **OR** acetaminophen (TYLENOL) suppository 650 mg, 650 mg, Rectal, Q6H PRN, Brigid Alvarado DO    polyethylene glycol (GLYCOLAX) packet 17 g, 17 g, Oral, Daily PRN, Marcia Walker, DO    cetirizine (ZYRTEC) tablet 10 mg, 10 mg, Oral, Daily PRN, Kyung Alvarado,     magnesium hydroxide (MILK OF MAGNESIA) 400 MG/5ML suspension 30 mL, 30 mL, Oral, Daily PRN, Kacey Doheny, APRN - CNP    aluminum & magnesium hydroxide-simethicone (MAALOX) 200-200-20 MG/5ML suspension 30 mL, 30 mL, Oral, PRN, Kacey Doheny, APRN - CNP    hydrOXYzine (VISTARIL) capsule 50 mg, 50 mg, Oral, TID PRN, Kacey Doheny, APRN - CNP, 50 mg at 04/19/21 2137    haloperidol lactate (HALDOL) injection 5 mg, 5 mg, Intramuscular, Q6H PRN **OR** haloperidol (HALDOL) tablet 5 mg, 5 mg, Oral, Q6H PRN, Kacey Doheny, APRN - CNP    traZODone (DESYREL) tablet 50 mg, 50 mg, Oral, Nightly PRN, Kacey Doheny, APRN - CNP, 50 mg at 04/20/21 2101    nicotine (NICODERM CQ) 21 MG/24HR 1 patch, 1 patch, Transdermal, Daily, Berenice Salcido MD, 1 patch at 04/21/21 1001      Examination:  /60   Pulse 87   Temp 98.3 °F (36.8 °C) (Temporal)   Resp 16   Ht 5' 8\" (1.727 m)   Wt 141 lb 4.8 oz (64.1 kg)   SpO2 100%   BMI 21.48 kg/m²   Gait - steady  Medication side effects(SE): None reported    Mental Status Examination:    Level of consciousness:  within normal limits   Appearance:  good grooming and good hygiene  Behavior/Motor:  no abnormalities noted  Attitude toward examiner:  cooperative and attentive  Speech:  normal rate, normal volume and well articulated   Mood: euthymic  Affect:  mood congruent  Thought processes:  linear and goal directed   Thought content: The patient is devoid of suicidal or homicidal ideation intent or plan. Devoid of auditory or visual hallucinations or other perceptual disturbances, there are no overt or covert signs of psychosis or paranoia. There are no neurovegetative signs of depression.   Cognition:  oriented to person, place, and time   Concentration intact  Insight fair   Judgement fair     ASSESSMENT:  Patient symptoms are:  [] Well controlled  [x] Improving  [] Worsening  [] No change      Diagnosis:   Active Problems:    CRISTY (generalized anxiety disorder)    Current severe episode of major depressive disorder without psychotic features without prior episode ([facility] [address]    Alcohol abuse  Resolved Problems:    * No resolved hospital problems. *      LABS:    No results for input(s): WBC, HGB, PLT in the last 72 hours. No results for input(s): NA, K, CL, CO2, BUN, CREATININE, GLUCOSE in the last 72 hours. No results for input(s): BILITOT, ALKPHOS, AST, ALT in the last 72 hours. Lab Results   Component Value Date    LABAMPH NOT DETECTED 04/14/2021    BARBSCNU NOT DETECTED 04/14/2021    LABBENZ NOT DETECTED 04/14/2021    LABMETH NOT DETECTED 04/14/2021    OPIATESCREENURINE NOT DETECTED 04/14/2021    PHENCYCLIDINESCREENURINE NOT DETECTED 04/14/2021    ETOH <10 04/14/2021     Lab Results   Component Value Date    TSH 0.98 03/03/2021     No results found for: LITHIUM  No results found for: VALPROATE, CBMZ        Treatment Plan:  The patient's diagnosis, treatment plan, medication management were formulated after patient was seen directly by the attending physician and myself and all relevant documentation was reviewed. Reviewed current Medications with the patient. Risk, benefit, side effects, possible outcomes of the medication and alternatives discussed with the patient and the patient demonstrated understanding. The patient was also educated that the outcome of treatment will depend on the medication compliance as directed by the prescribers along with regular follow-up, compliance with the labs and other work-up, as clinically indicated. Begin citalopram 20 mg for depression  Begin BuSpar 5 mg 3 times daily for anxiety  Collateral information: Followed by social work  CD evaluation  Encourage patient to attend group and other milieu activities.   Discharge planning discussed with the patient and treatment team.    PSYCHOTHERAPY/COUNSELING:  [x] Therapeutic interview  [x] Supportive  [] CBT  [] Ongoing  [] Other    [x] Patient continues to need, on a daily basis, active treatment furnished directly by or requiring the supervision of inpatient psychiatric personnel      Anticipated Length of stay: 2 to 5 days based on stability            Electronically signed by ANDRES Domínguez CNP on 4/21/2021 at 5:20 PM

## 2021-04-21 NOTE — GROUP NOTE
Group Therapy Note    Date: 4/21/2021    Group Start Time: 1330  Group End Time: 8510  Group Topic: Cognitive Skills    [facility]    CLARISSE Rawls LSW        Group Therapy Note    Attendees: 8         Patient's Goal:  Pt will be able to verbalize understanding of positive affirmations, and make positive affirmations that relate specifically to them    Notes:  Pt made connections and participated in group    Status After Intervention:  Improved    Participation Level:  Active Listener    Participation Quality: Appropriate, Attentive, Sharing and Supportive      Speech:  normal      Thought Process/Content: Logical      Affective Functioning: Congruent      Mood: depressed      Level of consciousness:  Alert and Oriented x4      Response to Learning: Able to verbalize current knowledge/experience      Endings: None Reported    Modes of Intervention: Education, Support, Socialization, Exploration, Clarifying, Problem-solving and Activity      Discipline Responsible: /Counselor      Signature:  CLARISSE Rawls LSW

## 2021-04-21 NOTE — PLAN OF CARE
Problem: Depressive Behavior With or Without Suicide Precautions:  Goal: Able to verbalize acceptance of life and situations over which he or she has no control  Description: Able to verbalize acceptance of life and situations over which he or she has no control  Outcome: Met This Shift     Problem: Depressive Behavior With or Without Suicide Precautions:  Goal: Able to verbalize and/or display a decrease in depressive symptoms  Description: Able to verbalize and/or display a decrease in depressive symptoms  Outcome: Met This Shift     Problem: Depressive Behavior With or Without Suicide Precautions:  Goal: Ability to disclose and discuss suicidal ideas will improve  Description: Ability to disclose and discuss suicidal ideas will improve  Outcome: Met This Shift     Problem: Depressive Behavior With or Without Suicide Precautions:  Goal: Able to verbalize support systems  Description: Able to verbalize support systems  Outcome: Met This Shift     Problem: Depressive Behavior With or Without Suicide Precautions:  Goal: Absence of self-harm  Description: Absence of self-harm  Outcome: Met This Shift    Patient denies SI/HI and hallucinations. Patient is pleasant, cooperative, bright, and social. Patient denies depression and anxiety. Patient takes prescribed medication without issue. Will continue to offer support and comfort to patient.

## 2021-04-21 NOTE — GROUP NOTE
Shared goal for the day today as to continue to focus on feeling ok, read. Group Therapy Note    Date: 4/21/2021    Group Start Time: 1115  Group End Time: 6447  Group Topic: Psychoeducation    [facility]  [address] [address] [facility], [place]                                                   Wellness Binder Information  Module Name: steps to deal with depression     Patient's Goal:will be able to identify 4 small steps one can make to ty symptoms of depression     Notes:  pleasant and engaged in group. Able to share when prompted. Status After Intervention:  Improved    Participation Level:  Active Listener and Interactive    Participation Quality: Appropriate, Attentive, Sharing, and Supportive      Speech:  normal     Thought Process/Content: Logical      Affective Functioning: Congruent      Mood: euthymic      Level of consciousness:  Alert, Oriented x4, and Attentive      Response to Learning: Able to verbalize/acknowledge new learning, Able to retain information, and Progressing to goal      Endings: None Reported    Modes of Intervention: Education, Support, Socialization, Exploration, and Problem-solving      Discipline Responsible: Psychoeducational Specialist      Signature:  Stu Casanova

## 2021-04-22 VITALS
HEART RATE: 64 BPM | OXYGEN SATURATION: 100 % | HEIGHT: 68 IN | TEMPERATURE: 98.4 F | WEIGHT: 141.3 LBS | RESPIRATION RATE: 16 BRPM | SYSTOLIC BLOOD PRESSURE: 114 MMHG | BODY MASS INDEX: 21.41 KG/M2 | DIASTOLIC BLOOD PRESSURE: 60 MMHG

## 2021-04-22 PROCEDURE — 99239 HOSP IP/OBS DSCHRG MGMT >30: CPT | Performed by: NURSE PRACTITIONER

## 2021-04-22 PROCEDURE — 6370000000 HC RX 637 (ALT 250 FOR IP): Performed by: NURSE PRACTITIONER

## 2021-04-22 PROCEDURE — 6370000000 HC RX 637 (ALT 250 FOR IP): Performed by: PSYCHIATRY & NEUROLOGY

## 2021-04-22 RX ORDER — TRAZODONE HYDROCHLORIDE 50 MG/1
50 TABLET ORAL NIGHTLY PRN
Qty: 30 TABLET | Refills: 0 | Status: SHIPPED | OUTPATIENT
Start: 2021-04-22 | End: 2021-06-18

## 2021-04-22 RX ORDER — CITALOPRAM 20 MG/1
20 TABLET ORAL DAILY
Qty: 30 TABLET | Refills: 0 | Status: SHIPPED | OUTPATIENT
Start: 2021-04-22 | End: 2021-04-22 | Stop reason: SDUPTHER

## 2021-04-22 RX ORDER — HYDROXYZINE PAMOATE 50 MG/1
50 CAPSULE ORAL 3 TIMES DAILY PRN
Qty: 42 CAPSULE | Refills: 0 | Status: SHIPPED | OUTPATIENT
Start: 2021-04-22 | End: 2021-05-06

## 2021-04-22 RX ORDER — CITALOPRAM 20 MG/1
20 TABLET ORAL DAILY
Qty: 30 TABLET | Refills: 0 | Status: SHIPPED | OUTPATIENT
Start: 2021-04-22 | End: 2021-05-20 | Stop reason: SDUPTHER

## 2021-04-22 RX ORDER — BUSPIRONE HYDROCHLORIDE 5 MG/1
5 TABLET ORAL 3 TIMES DAILY
Qty: 30 TABLET | Refills: 0 | Status: SHIPPED | OUTPATIENT
Start: 2021-04-22 | End: 2021-05-05 | Stop reason: SDUPTHER

## 2021-04-22 RX ORDER — NICOTINE 21 MG/24HR
1 PATCH, TRANSDERMAL 24 HOURS TRANSDERMAL DAILY
Qty: 30 PATCH | Refills: 0
Start: 2021-04-22 | End: 2021-06-18

## 2021-04-22 RX ADMIN — CITALOPRAM HYDROBROMIDE 20 MG: 20 TABLET ORAL at 09:13

## 2021-04-22 RX ADMIN — BUSPIRONE HYDROCHLORIDE 5 MG: 5 TABLET ORAL at 09:13

## 2021-04-22 RX ADMIN — LEVONORGESTREL AND ETHINYL ESTRADIOL 1 TABLET: KIT at 09:13

## 2021-04-22 RX ADMIN — BUSPIRONE HYDROCHLORIDE 5 MG: 5 TABLET ORAL at 13:13

## 2021-04-22 ASSESSMENT — PAIN SCALES - GENERAL: PAINLEVEL_OUTOF10: 0

## 2021-04-22 NOTE — SUICIDE SAFETY PLAN
SAFETY PLAN    A suicide Safety Plan is a document that supports someone when they are having thoughts of suicide. Warning Signs that indicate a suicidal crisis may be developing: What (situations, thoughts, feelings, body sensations, behaviors, etc.) do you experience that lets you know you are beginning to think about suicide? 1. Binge drinking/drug use  2. Risky behaviors  3. Not eating    Internal Coping Strategies:  What things can I do (relaxation techniques, hobbies, physical activities, etc.) to take my mind off my problems without contacting another person? 1. Read  2. Listen to music  3. Exercise    People and social settings that provide distraction: Who can I call or where can I go to distract me? 1. Name: Getachew Bowman.    2. Name: Jenny Sibley.  3. Place: The gym            4. Place: On a walk outside    People whom I can ask for help: Who can I call when I need help - for example, friends, family, clergy, someone else? 1. Name: Mom                Phone: [phone]  2. Name: Yamileth Davila  3. Name: Getachew Bowman. Professionals or [address] [place] [address] I can contact during a crisis: Who can I call for help - for example, my doctor, my psychiatrist, my psychologist, a mental health provider, a suicide hotline? 1. Suicide Hotline    2. Clinician Name: Jessika/Tory from [facility]    3. Suicide Prevention Lifeline: 6-887-054-TALK (9879)    4. [address] [place] [address] Emergency Services -  for example, [facility] Hotline: [address] [place] [address] [phone]    Making the environment safe: How can I make my environment (house/apartment/living space) safer? For example, can I remove guns, medications, and other items? 1. Remove alcohol- trigger  2.  Take medications as prescribed

## 2021-04-22 NOTE — PROGRESS NOTES
CLINICAL PHARMACY NOTE: MEDS TO [address] Select Patient?: No  Total # of Prescriptions Filled: 4   The following medications were delivered to the patient:  · Buspar 5 mg   · Hydroxyzine pamoate 50 mg   · Trazodone 50 mg  · Citalopram 20 mg     Total # of Interventions Completed: 1  Time Spent (min): 30    Additional Documentation:

## 2021-04-22 NOTE — CARE COORDINATION
In order to ensure appropriate transition and discharge planning is in place, the following documents have been transmitted to [facility], as the new outpatient provider:     · The d/c diagnosis was transmitted to the next care provider  · The reason for hospitalization was transmitted to the next care provider  · The d/c medications (dosage and indication) were transmitted to the next care provider   · The continuing care plan was transmitted to the next care provider
Pt remains calm and cooperative. Pt is taking medications and attending groups.  Pt may discharge tomorrow per treatment team.
This note will not be viewable in [employer] for the following reason(s). Suspected substance abuse disorder. [facility] Support Note    Name: Rony Obregon  Date: 4/21/2021    No chief complaint on file. Peer Support met with patient. [] Support and education provided  [] Resources provided   [] Treatment referral:   [] Other:   [] Patient declined peer recovery services     Referred By:  Dulce Varner and I had a nice conversation with Delfin Tanner. She is very adamant about working a program of recovery. Positivity throughout the conversation.     Signed: Jesse Schneider, 4/21/2021
inability to stop drinking is what caused her to attempt suicide. Pt's mother hopes that the pt will follow through with treatment outpatient.       Access to Weapons per Collateral Contact: [] Reports [x] Denies       Follow up provider preference: [facility] for discharge  Location (where do they plan on discharging to?): Home with parents    Transportation (who will pick them up at discharge?) Parents or a family friend    Medications (will they have money for copays at discharge?): Pt able to pay

## 2021-04-22 NOTE — PLAN OF CARE
Problem: Depressive Behavior With or Without Suicide Precautions:  Goal: Able to verbalize acceptance of life and situations over which he or she has no control  Description: Able to verbalize acceptance of life and situations over which he or she has no control  4/22/2021 0855 by Luna Duarte RN  Outcome: Ongoing     Problem: Depressive Behavior With or Without Suicide Precautions:  Goal: Able to verbalize and/or display a decrease in depressive symptoms  Description: Able to verbalize and/or display a decrease in depressive symptoms  4/22/2021 0855 by Luna Duarte RN  Outcome: Ongoing     Problem: Depressive Behavior With or Without Suicide Precautions:  Goal: Ability to disclose and discuss suicidal ideas will improve  Description: Ability to disclose and discuss suicidal ideas will improve  4/22/2021 0855 by Luna Duarte RN  Outcome: Ongoing     Problem: Depressive Behavior With or Without Suicide Precautions:  Goal: Able to verbalize support systems  Description: Able to verbalize support systems  4/22/2021 0855 by Luna Duarte RN  Outcome: Ongoing     Patient denies suicidal ideations, homicidal ideations, and hallucinations. She is bright and social with peers. Patient is medication compliant and in control of her behavior.

## 2021-04-22 NOTE — PROGRESS NOTES
[facility]  Discharge Note    Pt discharged with followings belongings:   Dentures: None  Vision - Corrective Lenses: Glasses  Hearing Aid: None  Jewelry: None  Body Piercings Removed: No  Clothing: Shirt, Jacket / coat, Pants, Footwear, Undergarments (Comment)(tshirt, leggings, bra, shoes)  Were All Patient Medications Collected?: Yes  Other Valuables: Cell phone, Other (Comment)(cell phone, , toiletries,purse, cigarettes, keys, lighter, debit card, change, store cards,makeup, pocket knife)   Valuables sent home with patient. Valuables retrieved from safe, Security envelope number:  N/A and returned to patient. Patient education on aftercare instructions: yes  Information faxed to n/a by n/a Patient verbalize understanding of AVS:  yes.     Status EXAM upon discharge:  Status and Exam  Normal: No  Facial Expression: Brightened  Affect: Congruent  Level of Consciousness: Alert  Mood:Normal: No  Mood: Anxious  Motor Activity:Normal: Yes  Interview Behavior: Cooperative  Preception: Superior to Person, Jenene Lacer to Time, Superior to Place, Superior to Situation  Attention:Normal: Yes  Thought Processes: Other(See comment)  Thought Content:Normal: Yes  Thought Content: Preoccupations  Hallucinations: None  Delusions: No  Memory:Normal: Yes  Insight and Judgment: Yes  Insight and Judgment: Poor Judgment  Present Suicidal Ideation: No  Present Homicidal Ideation: No      Metabolic Screening:    Lab Results   Component Value Date    LABA1C 4.6 04/20/2021       Lab Results   Component Value Date    CHOL 185 04/20/2021     Lab Results   Component Value Date    TRIG 168 (H) 04/20/2021     Lab Results   Component Value Date    HDL 58 04/20/2021     No components found for: [facility]  Lab Results   Component Value Date    LABVLDL 34 04/20/2021       Misael Mccauley RN

## 2021-04-22 NOTE — DISCHARGE SUMMARY
DISCHARGE SUMMARY      Patient ID:  Josue Sexton  [id number]  64 y.o.  1995    Admit date: 4/19/2021    Discharge date and time: 4/22/2021    Admitting Physician: Nish Velásquez MD     Discharge Physician: Dr Rolan Eng MD    Discharge Diagnoses:   Patient Active Problem List   Diagnosis    Herpes simplex vulvovaginitis    Irregular bleeding    Right ovarian cyst    CRISTY (generalized anxiety disorder)    Depression with suicidal ideation    Drug overdose, intentional self-harm, [facility] encounter ([facility]    Current severe episode of major depressive disorder without psychotic features without prior episode ([facility]    Alcohol abuse       Admission Condition: poor    Discharged Condition: stable    Admission Circumstance: Followed in psychiatric consult services for intentional overdose, pink slipped and admitted to [facility] after medical clearance.     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   Social Needs    Financial resource strain: Not hard at all   [employer] insecurity     Worry: Never true     Inability: Never true    Transportation needs     Medical: No     Non-medical: No   Tobacco Use    Smoking status: Never Smoker    Smokeless tobacco: Never Used   Substance and Sexual Activity    Alcohol use: Yes     Frequency: 2-4 times a month     Drinks per session: 1 or 2     Comment: social    Drug use: No    Sexual activity: Yes     Partners: Male     Birth control/protection: Condom   Lifestyle    Physical activity     Days per week: Not on file     Minutes per session: Not on file    Stress: Not on file   Relationships    Social connections     Talks on phone: Not on file     Gets together: Not on file     Attends Tenriism service: Not on file     Active member of club or organization: Not on file     Attends meetings of clubs or organizations: Not on file     Relationship status: Not on file    Intimate partner violence     Fear of current or ex partner: Not on file     Emotionally abused: Not on file     Physically abused: Not on file     Forced sexual activity: Not on file   Other Topics Concern    Not on file   Social History Narrative    ** Merged History Encounter **            MEDICATIONS:    Current Facility-Administered Medications:     levonorgestrel-ethinyl estradiol (NORDETTE) 0.15-30 MG-MCG per tablet 1 tablet, 1 tablet, Oral, Daily, Alirio Bassett MD, 1 tablet at 04/21/21 0957    citalopram (CELEXA) tablet 20 mg, 20 mg, Oral, Daily, Roberta Shaw APRN - CNP, 20 mg at 04/21/21 0957    busPIRone (BUSPAR) tablet 5 mg, 5 mg, Oral, TID, Roberta Shaw APRN - CNP, 5 mg at 04/21/21 2112    acetaminophen (TYLENOL) tablet 650 mg, 650 mg, Oral, Q6H PRN **OR** acetaminophen (TYLENOL) suppository 650 mg, 650 mg, Rectal, Q6H PRN, Jennie Alvarado,     polyethylene glycol (GLYCOLAX) packet 17 g, 17 g, Oral, Daily PRN, Jennie Alvarado,     cetirizine (ZYRTEC) tablet 10 mg, 10 mg, Oral, Daily PRN, Jennie Alvarado,     magnesium hydroxide (MILK OF MAGNESIA) 400 MG/5ML suspension 30 mL, 30 mL, Oral, Daily PRN, Roberta Shaw APRN - CNP    aluminum & magnesium hydroxide-simethicone (MAALOX) 200-200-20 MG/5ML suspension 30 mL, 30 mL, Oral, PRN, Roberta Bullards, APRN - CNP    hydrOXYzine (VISTARIL) capsule 50 mg, 50 mg, Oral, TID PRN, Roberta Shaw APRN - CNP, 50 mg at 04/19/21 2137    haloperidol lactate (HALDOL) injection 5 mg, 5 mg, Intramuscular, Q6H PRN **OR** haloperidol (HALDOL) tablet 5 mg, 5 mg, Oral, Q6H PRN, Angie Campbell APRN - CNP    traZODone (DESYREL) tablet 50 mg, 50 mg, Oral, Nightly PRN, ANDRES Sanches - CNP, 50 mg at 04/21/21 2112    nicotine (NICODERM CQ) 21 MG/24HR 1 patch, 1 patch, Transdermal, Daily, Kimberly Kingston MD, 1 patch at 04/21/21 1001    Examination:  /79   Pulse 83   Temp 97.8 °F (36.6 °C) (Temporal)   Resp 16   Ht 5' 8\" (1.727 m)   Wt 141 lb 4.8 oz (64.1 kg)   SpO2 100%   BMI 21.48 kg/m²   Gait - steady    HOSPITAL COURSE[de-identified]  Following admission to the hospital, patient had a complete physical exam and blood work up, which she was medically cleared and admitted to [facility] for psychiatric evaluation and stabilization. The patient was monitored closely with suicide and appropriate precautions. She was started on telegram 20 mg daily for depression and anxiety, BuSpar 5 mg 3 times daily for anxiety. She was encouraged to participate in group and other milieu activity and started to feel better with this combination of treatment. There has been significant progress in the improvement of symptoms since admission. The patient has been an active participant in his treatment, and discharge planning. The patient was no longer suicidal, homicidal, psychotic or manic. She received the required treatment with medication, participated in group milieu, remained engaged in unit activities and learn appropriate coping skills. She was seen to be watching television playing games and socializing with peers and using the phone. There were no mention or gestures of self-harm or harm to others. Her mental status returned to baseline. The treatment team believes patient has obtained maximum benefit out of this hospitalization and does not meet criteria for inpatient hospitalization anymore. However she will continue to benefit from outpatient follow-up and treatment to maintain stability.   Collateral information has been obtained and reconciled and there are no concerns about her safety. She has no access to guns or weapons. She appreciates the help that she received here. This patient no longer meets criteria for inpatient hospitalization. She was discharged home to her family in psychiatrically stable condition. Appetite:  [x] Normal  [] Increased  [] Decreased    Sleep:       [x] Normal  [] Fair       [] Poor            Energy:    [x] Normal  [] Increased  [] Decreased     SI [] Present  [x] Absent  HI  []Present  [x] Absent   Aggression:  [] yes  [] no  Patient is [x] able  [] unable to CONTRACT FOR SAFETY   Medication side effects(SE):  [x] None(Psych. Meds.) [] Other      Mental Status Examination on discharge:    Level of consciousness:  within normal limits   Appearance:  well-appearing  Behavior/Motor:  no abnormalities noted  Attitude toward examiner:  attentive and good eye contact  Speech:  spontaneous, normal rate and normal volume   Mood: euthymic  Affect:  mood congruent  Thought processes:  linear and goal directed   Thought content: The patient is devoid of suicidal or homicidal ideation intent or plan. Devoid of auditory or visual hallucinations or other perceptual disturbances, there are no overt or covert signs of psychosis or paranoia. There are no neurovegetative signs of depression.   Cognition:  oriented to person, place, and time   Concentration intact  Memory intact  Insight good   Judgement fair   Fund of Knowledge adequate      ASSESSMENT:  Patient symptoms are:  [x] Well controlled  [x] Improving  [] Worsening  [] No change    Reason for more than one antipsychotic:  [x] N/A  [] 3 Failed Monotherapy attempts (Drugs tried:)  [] Crossover to a new antipsychotic  [] Taper to Monotherapy from Polypharmacy  [] Augmentation of clozapine therapy due to treatment resistance to single therapy    Diagnosis:  Active Problems:    CRISTY (generalized anxiety disorder)    Current severe episode of major depressive disorder without psychotic features without prior episode ([facility]    Alcohol abuse  Resolved Problems:    * No resolved hospital problems. *      LABS:    No results for input(s): WBC, HGB, PLT in the last 72 hours. No results for input(s): NA, K, CL, CO2, BUN, CREATININE, GLUCOSE in the last 72 hours. No results for input(s): BILITOT, ALKPHOS, AST, ALT in the last 72 hours. Lab Results   Component Value Date    LABAMPH NOT DETECTED 04/14/2021    BARBSCNU NOT DETECTED 04/14/2021    LABBENZ NOT DETECTED 04/14/2021    LABMETH NOT DETECTED 04/14/2021    OPIATESCREENURINE NOT DETECTED 04/14/2021    PHENCYCLIDINESCREENURINE NOT DETECTED 04/14/2021    ETOH <10 04/14/2021     Lab Results   Component Value Date    TSH 0.98 03/03/2021     No results found for: LITHIUM  No results found for: VALPROATE, CBMZ    RISK ASSESSMENT AT DISCHARGE: Low risk for suicide and homicide. Treatment Plan:  The patient's diagnosis, treatment plan, medication management were formulated after patient was seen directly by the attending physician and myself and all relevant documentation was reviewed. Reviewed current Medications with the patient. Education provided on the complaince with treatment. Risk, benefit, side effects, possible outcomes of the medication and alternatives discussed with the patient and the patient demonstrated understanding. The patient was also educated that the outcome of treatment will depend on the medication compliance as directed by the prescribers along with regular follow-up, compliance with the labs and other work-up, as clinically indicated. The patient was referred to outpatient/inpatient substance abuse rehabilitation programming.   She was educated multiple times during the hospitalization that if she chooses to continue to use drugs or alcohol, she may act out impulsively, resulting in serious harm to self or others even though unintentional.  She was also educated that mental health treatment cannot be optimized with ongoing use of drugs or alcohol she demonstrated understanding and has the capacity to understand that. Encourage patient to attend outpatient follow up appointment and therapy follow-up outpatient appointments been scheduled prior to discharge. Patient was advised to call the outpatient provider, visit the nearest ED or call 911 if symptoms are not manageable. Patient's family member was contacted prior to the discharge, and the patient has been discharged home in psychiatrically stable condition. .         Medication List      START taking these medications    busPIRone 5 MG tablet  Commonly known as: BUSPAR  Take 1 tablet by mouth 3 times daily     nicotine 21 MG/24HR  Commonly known as: NICODERM CQ  Place 1 patch onto the skin daily     traZODone 50 MG tablet  Commonly known as: DESYREL  Take 1 tablet by mouth nightly as needed for Sleep        CHANGE how you take these medications    hydrOXYzine 50 MG capsule  Commonly known as: VISTARIL  Take 1 capsule by mouth 3 times daily as needed for Anxiety  What changed:   · medication strength  · how much to take        CONTINUE taking these medications    citalopram 20 MG tablet  Commonly known as: CELEXA  Take 1 tablet by mouth daily     Drospiren-Eth Estrad-Levomefol 3-0.02-0.451 MG Tabs  TAKE 1 TABLET BY MOUTH DAILY           Where to Get Your Medications      These medications were sent to [facility] [address] [address]    Phone: [phone]   · busPIRone 5 MG tablet  · citalopram 20 MG tablet  · hydrOXYzine 50 MG capsule  · traZODone 50 MG tablet     Information about where to get these medications is not yet available    Ask your nurse or doctor about these medications  · nicotine 21 MG/24HR           TIME SPEND - 35 MINUTES TO COMPLETE THE EVALUATION, DISCHARGE SUMMARY, MEDICATION RECONCILIATION AND FOLLOW UP CARE     Signed:  Soto Estrada 4/22/2021  8:33 AM

## 2021-04-28 ENCOUNTER — HOSPITAL ENCOUNTER (OUTPATIENT)
Dept: PSYCHIATRY | Age: 26
Setting detail: THERAPIES SERIES
Discharge: HOME | End: 2021-04-28
Payer: COMMERCIAL

## 2021-04-28 PROCEDURE — 90791 PSYCH DIAGNOSTIC EVALUATION: CPT

## 2021-04-28 ASSESSMENT — ANXIETY QUESTIONNAIRES
3. WORRYING TOO MUCH ABOUT DIFFERENT THINGS: 2-OVER HALF THE DAYS
2. NOT BEING ABLE TO STOP OR CONTROL WORRYING: 2-OVER HALF THE DAYS
6. BECOMING EASILY ANNOYED OR IRRITABLE: 3-NEARLY EVERY DAY
4. TROUBLE RELAXING: 2-OVER HALF THE DAYS
7. FEELING AFRAID AS IF SOMETHING AWFUL MIGHT HAPPEN: 0-NOT AT ALL
1. FEELING NERVOUS, ANXIOUS, OR ON EDGE: 3-NEARLY EVERY DAY
GAD7 TOTAL SCORE: 14

## 2021-04-28 ASSESSMENT — PATIENT HEALTH QUESTIONNAIRE - PHQ9: SUM OF ALL RESPONSES TO PHQ QUESTIONS 1-9: 9
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
## 2021-04-28 NOTE — CARE COORDINATION
Name: Rony Ramírez  Diagnostic Impression    Scale: DSM-V Diagnosis Code   No diagnosis                    0-1     Mild LAURA                          2-3     Moderate LAURA                 4-5     Severe LAURA                      6+ Alcohol use disorder, F10.20   Other factors: CRISTY F41.10, unspecified depressive disorder, F32.9            Criteria symptoms   A problematic pattern of substance use leading to clinically significant impairment or distress, as manifested by at least two of the following, occurring within a 12-month period. [x] Taken in larger amounts or over longer time than intended. [x] Persistent desire or unsuccessful efforts to cut down/control use. [] Great deal of time spent obtaining , using, and recovering from effects. [] Craving or strong desire to use. [x] Recurrent use resulting in failure to fulfill major role obligations at work; school, or home. [x] Continued use despite persistent or recurrent social/interpersonal problems caused or exacerbated by effects. [] Giving up important social, occupational or recreational activities because of use. [] Recurrent use in situations in which it is physically hazardous. [x] Continued use despite knowledge of persistent or recurrent physical or psychological problem likely caused/exacerbated by use. [x] Tolerance as defined by either:  · Need for increased amounts to achieve intoxication or desired effects. · Diminished effect with continued use of the same amount. [] Withdrawal as manifested by either:  · The characteristic withdrawl symptoms  · Using to relieve or avoid withdrawal symptoms          Signed: Jerrod Padilla     4/28/2021     This note will not be viewable in [employer] for the following reason(s). Suspected substance abuse disorder.        Electronically signed by CLARISSE Davis, JANIW on 4/28/2021 at 2:17 PM

## 2021-04-28 NOTE — PLAN OF CARE
This note will not be viewable in MyChart for the following reason(s). Suspected substance abuse disorder.      [address] Placement        [x]???? Admissions        []???? Continued Stay  []????Discharge/Transfer/Complication XF AB    [facility]: 9/60/12                              Rubi GONZALES        Level of Care Level 1      Outpatient Services Level 2.1   Intensive Outpatient Services(IOP) Level 2.5   Partial Hospitalization Services Level 3.1 CLINICALLY Managed Low-Intensity Residential Services Level 3.3  CLINICALLY Managed Population- Specific High- Intensity Residential Services Level 3.5  CLINICALLY Managed High Intensive Residential Services Level 3.7  MEDICALLY Monitored Intensive Inpatient Services Level 4  MEDICALLY Managed Intensive Inpatient Services   Dimension 1  Acute Intoxication and/or Withdrawal Potential []???? Not experiencing significant withdrawal     []???? Minimal  risk of severe  withdrawal []???? Minimal  risk of severe  withdrawal     [x]? ??? Manageable  at Level 2- []???? Moderate  risk of severe withdrawal     []???? Manageable at Level 2- []???? No withdrawal risk or minimal or stable withdrawal      []???? Concurrently receiving Level - or Level 2- services []???? Minimal risk of severe withdrawal     []???? If withdrawal is present, manageable at Level 3. 2-  []???? Minimal risk of severe withdrawal     []???? If withdrawal is present manageable at Level 3. 2- []???? High risk of withdrawal, but manageable at Level  3.7- and does not require  full resources of a licensed hospital []???? At high risk of withdrawal and requires Level 4- and full resources of licensed hospital    COMMENTS:                   Dimension 2   Biomedical Conditions and Complications  (BMC/C) []???? None or very stable     []???? Receiving concurrent medical monitoring  [x]???? None or not a distraction from treatment     []???? Problems are manageable at Level 2.1 []???? None or not sufficient to distract from treatment     []???? Problems are manageable at  Level 2.5 []???? None or stable     []???? Receiving concurrent medical monitoring  []???? None or stable     []???? Receiving concurrent medical monitoring  []???? None or stable     []???? Receiving concurrent medical monitoring []???? Requires 24-hour medical monitoring  but not intensive treatment []???? Requires 24-hour medical & nursing care and the full  resources of a licensed hospital   COMMENTS:                   Dimension 3  Emotional,  Behavioral,  or Cognitive Conditions and Complications   (EBC/C) []???? None or very stable     []???? Receiving concurrent mental health monitoring [x]???? Mild severity  with potential to distract from recovery; needs monitoring []???? Mild to moderate severity, with potential to distract from recovery, needs stabilization []???? None or minimal; not distracting to recovery     []???? If  stable, a    co-occurring capable program is appropriate     []???? If not stable, a co-occurring enhanced program is required []???? Mild to moderate severity; needs structure to focus on recovery. Treatment should be designed to address significant cognitive deficits      []???? If  stable, a  co-occurring capable program is appropriate     []???? If not stable, a co-occurring enhanced program is required []???? Demonstrates repeated inability to control impulses or unstable & dangerous signs/ symptoms require stabilization. Other functional deficits require stabilization and 24-hr.  setting to prepare for community integration  & continuing care     []???? Co-occurring enhanced setting is required for those with severe & chronic mental illness []???? Moderate severity;  needs 24-hour   structured setting     []???? If client has co-occurring mental disorder, requires concurrent mental health services in a medically monitored setting  []???? Severe and unstable problems;  requires 24-hour psychiatric care  with concomitant addiction treatment   COMMENTS:                      Electronically signed by CLARISSE Naik, YOSEF on 4/28/2021 at 2:18 PM                        [address]acement        [x]???? Admissions              []???? Continued Stay       []????Discharge/Transfer / Complication in [place]    Date: 4/28/21                            Rubi Burleson        Level of Care Level 1  Outpatient   Services Level 2.1  Intensive  Outpatient  Services Level 2.5  Partial Hospitalization Services Level 3.1  CLINICALLY Managed Low-intensity Residential Services Level 3.3  CLINICALLY Managed Population- Specific High- Intensity Residential Services Level 3.5  CLINICALLY Managed High-Intensive Residential Services Level 3.7  MEDICALLY Monitored Intensive Inpatient Services Level 4  MEDICALLY Managed Intensive Inpatient Services   Dimension 4  Readiness  To  Change []???? Ready for recovery but needs motivating and monitoring strategies to strengthen readiness     []????Needs ongoing monitoring and disease management     []???? High severity in this dimension but not in other dimensions. Needs Level 1 motivational enhancement strategies    [x]???? Has variable engagement in treatment, ambivalence, or lack of awareness of substance use or mental health problems and requires structured program several times/wk.  to promote progress through stages of change []???? Has poor engagement in treatment, significant ambivalence, or lack of awareness of substance use or mental health problems, requires near daily structured program or intensive engagement to promote progress through stages of change []???? Open to recovery, but needs structured environment to maintain therapeutic gains []???? Has little awareness & needs interventions at Level 3.3 to engage & stay in treatment.     []???? If there is high severity in this dimension, dangerous  consequences []???? Unable to control use, with imminently dangerous consequences,  despite active participation at less intensive levels of care []???? Problems in this dimension do not qualify the client for Level 4 services     []???? If the client's only severity is in Dimension 4,5, and/or 6 without high severity in Dimensions 1,2, and/or 3, then client is not qualified for Level 4   COMMENTS:                   Dimension 6  Recovery/Living Environment []????  Recovery environment is supportive and/or the client has skills to cope [x]???? Recovery environment is not supportive  but with structure and support, the client can cope []???? Recovery environment is not supportive, but with structure and support and relief from the home environment, client can cope []???? Environment    is dangerous, but recovery is achievable if Level 3.1 24-hour structure is available  []???? Environment is dangerous and  client needs 24-hour structure to learn to cope []???? Environment is dangerous, and the client lacks skills to cope outside of a  highly structured 24-hour setting   []???? Environment is dangerous, and the client lacks skills to cope outside of a  highly structured 24-hour setting []???? Problems in this dimension do not qualify the client for Level 4 services     []???? If the client's only severity is in Dimension 4,5, and/or 6 without high severity in Dimensions 1,2, and/or 3, then client is not qualified for Level 4   COMMENTS:                      Electronically signed by CLARISSE Brumfield, YOSEF on 4/28/2021 at 2:18 PM

## 2021-05-03 ENCOUNTER — HOSPITAL ENCOUNTER (OUTPATIENT)
Dept: PSYCHIATRY | Age: 26
Setting detail: THERAPIES SERIES
Discharge: HOME | End: 2021-05-03
Payer: COMMERCIAL

## 2021-05-03 PROCEDURE — 90853 GROUP PSYCHOTHERAPY: CPT | Performed by: COUNSELOR

## 2021-05-03 PROCEDURE — 99213 OFFICE O/P EST LOW 20 MIN: CPT | Performed by: PSYCHIATRY & NEUROLOGY

## 2021-05-03 NOTE — H&P
DUAL DIAGNOSIS & MAT EVALUATION    CHIEF COMPLAINT:  \"Doing much better. \"    HISTORY OF PRESENT ILLNESS: Jessica Britt is a 31 y.o. female who presents for dual diagnostic evaluation at [facility] as a referral from our inpatient unit where she was hospitalized from 4/19 to 4/22 following an overdose while intoxicated. Met with patient today for in-person assessment. Amando Members was pleasant, cooperative and provides good history. She reports a history of vague depression and anxiety that she was self-medicating for with alcohol. Patient reports she would not drink every day but when she did it would be excessive and interfered with relationships. She was started on Celexa and Vistaril by primary care in March which she reports helped noticeably with depression but not as much with anxiety. While inpatient she was also started on Buspar 5 mg tid which seems to be helping. Patient reports the only time she consumed alcohol since discharge was once over the weekend at a family function in which she was able to consume one mixed drink and stop after that. Patient is currently living at home with parents but still working remotely in [place] and is scheduled to move back there at the beginning of June. In further review of symptoms there is no history of osvaldo or psychosis. No current suicidal or homicidal ideations. SUBSTANCE ABUSE HISTORY: Patient denies history of alcohol withdrawal symptoms or seizures. She has never been to detox or rehab. She has used cocaine and marijuana occasionally in the past but denies current illicit drug use. PAST PSYCHIATRIC HISTORY: No history of psychiatric hospitalizations prior to recent one. No history of prior suicide attempts but does have a history of cutting. No current psychiatrist or therapist.    PAST MEDICAL HISTORY:       Diagnosis Date    Bell's palsy     21 yo    Herpes simplex     1     ALLERGIES: Patient has no known allergies.     FAMILY HISTORY:

## 2021-05-03 NOTE — PLAN OF CARE
This note will not be viewable in [employer] for the following reason(s). This is a Psychotherapy Note. This virtual group therapy visit was conducted via:     [] Telephone    [x] Video    [] In-person at [id number] [facility] [place]    Patient Location:     [x] Patient's Home  [] Other:      Provider Location (City/State):     [x] [facility]   [] Brenda Mealing         Date:  05/03/2021    Start Time: 8:30 AM    End Time:  10:00 AM    Number of Participants: 7    Type of Group: Psychotherapy    Patient's Goal:  To increase socialization and improve interpersonal relationships while addressing personal mental health and addiction concerns. Notes: Today was the pt's first IOP session. The theme of today's group was handling triggers and staying sober. At check in, pt reports she is feeling \"good, a little tired. \" Pt was very attentive throughout the session, as evidenced by her non-verbal communications, but the pt remained quiet throughout the session. Pt did share that her personal goal for the day is to \"try to participate. \"    Status After Intervention:  Unchanged    Participation Level:  Active Listener and Minimal    Participation Quality: Appropriate and Attentive    Speech:  normal    Thought Process/Content: Logical    Affective Functioning: Congruent    Mood: euthymic    Level of consciousness:  Alert, Oriented x4 and Attentive    Response to Learning: Able to retain information    Endings: None Reported    Modes of Intervention: Education, Support, Socialization, Exploration and Clarifying    Discipline Responsible: /Counselor    Electronically signed by Pastora Parra LPC on 5/3/2021 at 11:54 AM

## 2021-05-04 ENCOUNTER — HOSPITAL ENCOUNTER (OUTPATIENT)
Dept: PSYCHIATRY | Age: 26
Setting detail: THERAPIES SERIES
Discharge: HOME | End: 2021-05-04
Payer: COMMERCIAL

## 2021-05-04 PROCEDURE — 90853 GROUP PSYCHOTHERAPY: CPT | Performed by: COUNSELOR

## 2021-05-04 NOTE — PLAN OF CARE
This note will not be viewable in [employer] for the following reason(s). This is a Psychotherapy Note. This virtual group therapy visit was conducted via:     [] Telephone    [x] Video    [] [facility]    Patient Location:     [x] Patient's Home  [] Other:      Provider Location (City/State):     [x] [facility]   [] Michelle Govern         Date:  05/03/2021    Start Time: 10:15 AM    End Time:  11:30 AM    Number of Participants: 5    Type of Group: Recovery    Patient's Goal:  Pt will identify healthy habits that they would like to implement and what existing habits they can use to support these habits. Notes:  Pt was very attentive during session as evidenced by her non-verbal communication. Pt did not choose to participate in discussion, but appeared to be retaining the information. Status After Intervention:  Unchanged    Participation Level:  Active Listener and Minimal    Participation Quality: Appropriate and Attentive    Speech:  normal    Thought Process/Content: Logical    Affective Functioning: Congruent    Mood: euthymic    Level of consciousness:  Alert, Oriented x4 and Attentive    Response to Learning: Able to retain information    Endings: None Reported    Modes of Intervention: Education, Support, Socialization, Exploration, Clarifying and Activity    Discipline Responsible: /Counselor    Electronically signed by Genia Ray LPC on 5/3/2021 at 8:36 PM

## 2021-05-04 NOTE — PLAN OF CARE
This note will not be viewable in [employer] for the following reason(s). This is a Psychotherapy Note. This virtual group therapy visit was conducted via:      []? Telephone    [x]? Video    []? In-person at [facility]     Patient Location:      [x]? Patient's Home  []? Other:    Provider Location (City/State):      [facility]   []? Awa Montejo            Date: 05/04/2021     Start Time: 8:30 AM     End Time:  10:05 AM     Number of Participants: 6     Type of Group: Psychotherapy     Patient's Goal:  To increase socialization and improve interpersonal relationships while addressing personal mental health and addiction concerns.     Notes: The themes of today's session were rebuilding relationships and learning you can't control others' actions. At check in, pt reports she is feeling \"good. \" Pt was very interactive today and discussed with the group how she has learned that you can only control yourself and \"no one else can fix me, but me. \" Pt processed these thoughts with the group. Pt states her goal for the day is to [employer] and get work stuff done. \"    Status After Intervention:  Unchanged    Participation Level:  Active Listener and Interactive    Participation Quality: Appropriate, Attentive, Sharing and Supportive    Speech:  normal    Thought Process/Content: Logical    Affective Functioning: Congruent    Mood: euthymic    Level of consciousness:  Alert, Oriented x4 and Attentive    Response to Learning: Able to verbalize current knowledge/experience, Able to verbalize/acknowledge new learning, Able to retain information and Capable of insight    Endings: None Reported    Modes of Intervention: Education, Support, Socialization, Exploration and Clarifying    Discipline Responsible: /Counselor    Electronically signed by Adam Mendoza LPC on 5/4/2021 at 1:54 PM

## 2021-05-05 RX ORDER — BUSPIRONE HYDROCHLORIDE 5 MG/1
5 TABLET ORAL 3 TIMES DAILY
Qty: 90 TABLET | Refills: 0 | Status: SHIPPED | OUTPATIENT
Start: 2021-05-05 | End: 2021-06-18

## 2021-05-06 ENCOUNTER — HOSPITAL ENCOUNTER (OUTPATIENT)
Dept: PSYCHIATRY | Age: 26
Setting detail: THERAPIES SERIES
Discharge: HOME | End: 2021-05-06
Payer: COMMERCIAL

## 2021-05-06 PROCEDURE — 90853 GROUP PSYCHOTHERAPY: CPT | Performed by: COUNSELOR

## 2021-05-06 NOTE — PLAN OF CARE
This note will not be viewable in [employer] for the following reason(s). This is a Psychotherapy Note. This virtual group therapy visit was conducted via:     [] Telephone    [x] Video    [] In-person at [id number] [address]    Patient Location:     [x] Patient's Home  [] Other:      Provider Location ([facility]/State):     [place] [facility]   [] Suresh Saavedra         Date: 05/06/2021    Start Time: 8:30 AM    End Time:  10:10 AM    Number of Participants: 6    Type of Group: Psychotherapy    Patient's Goal:  To increase socialization and improve interpersonal relationships while addressing personal mental health and addiction concerns. Notes: The themes of today's session were feeling a need to fix others and struggles related to sexuality. At check in, pt reports she is feeling \"good and refreshed. \" Pt was very interactive in the discussion of stigmas around sexuality. Pt shared a story about a close friend of hers and processed her feelings about sexuality with the group. Pt states her goal for the day is to \"get everything done and pack for my friend's wedding this weekend. \"    Status After Intervention:  Improved    Participation Level:  Active Listener and Interactive    Participation Quality: Appropriate, Attentive, Sharing and Supportive    Speech:  normal    Thought Process/Content: Logical    Affective Functioning: Congruent    Mood: euthymic    Level of consciousness:  Alert, Oriented x4 and Attentive    Response to Learning: Able to verbalize current knowledge/experience, Able to verbalize/acknowledge new learning, Able to retain information and Capable of insight    Endings: None Reported    Modes of Intervention: Education, Support, Socialization, Exploration and Clarifying    Discipline Responsible: /Counselor     Electronically signed by Lum Kocher, LPC on 5/6/2021 at 11:38 AM

## 2021-05-06 NOTE — PLAN OF CARE
This note will not be viewable in [email or web address] for the following reason(s). This is a Psychotherapy Note. This virtual group therapy visit was conducted via:     [] Telephone    [x] Video    [] In-person at [facility], [place]    Patient Location:     [x] Patient's Home  [] Other:      Provider Location (City/State):     [x] [facility]   [] [place]         Date: 05/06/2021    Start Time: 10:25 AM    End Time:  11:30 AM    Number of Participants: 5    Type of Group: Relapse Prevention    Patient's Goal:  Patient will learn about and assess their self-care habits. Pt will identify areas of improvement as well as strengths. Notes:  Pt was very interactive and attentive during this session. Pt shared with the group that she struggles a lot with self-care in general, especially when she is feeling depressed. Pt identifies that she struggles with motivation to even take care of her physical hygiene when she is depressed, so she fights against it to do these things even when she doesn't feel like it. Pt shared that she does have strong social self-care but would like to improve her self-care overall. Status After Intervention:  Unchanged    Participation Level:  Active Listener and Interactive    Participation Quality: Appropriate, Attentive, Sharing and Supportive    Speech:  normal    Thought Process/Content: Logical    Affective Functioning: Congruent    Mood: euthymic    Level of consciousness:  Alert, Oriented x4 and Attentive    Response to Learning: Able to verbalize current knowledge/experience, Able to verbalize/acknowledge new learning, Able to retain information and Capable of insight    Endings: None Reported    Modes of Intervention: Education, Support, Socialization, Exploration, Clarifying and Activity    Discipline Responsible: /Counselor     Electronically signed by Michelle Cantu LPC on 5/6/2021 at 1:10 PM

## 2021-05-10 ENCOUNTER — APPOINTMENT (OUTPATIENT)
Dept: PSYCHIATRY | Age: 26
End: 2021-05-10
Payer: COMMERCIAL

## 2021-05-11 ENCOUNTER — APPOINTMENT (OUTPATIENT)
Dept: PSYCHIATRY | Age: 26
End: 2021-05-11
Payer: COMMERCIAL

## 2021-05-13 ENCOUNTER — APPOINTMENT (OUTPATIENT)
Dept: PSYCHIATRY | Age: 26
End: 2021-05-13
Payer: COMMERCIAL

## 2021-05-17 ENCOUNTER — HOSPITAL ENCOUNTER (OUTPATIENT)
Dept: PSYCHIATRY | Age: 26
Setting detail: THERAPIES SERIES
Discharge: HOME | End: 2021-05-17
Payer: COMMERCIAL

## 2021-05-17 ENCOUNTER — HOSPITAL ENCOUNTER (OUTPATIENT)
Dept: PSYCHIATRY | Age: 26
Setting detail: THERAPIES SERIES
End: 2021-05-17
Payer: COMMERCIAL

## 2021-05-17 DIAGNOSIS — F41.1 GAD (GENERALIZED ANXIETY DISORDER): ICD-10-CM

## 2021-05-17 NOTE — BH NOTE
Called patient for discharge appointment but no answer.                           Electronically signed by Misael Stark MD on 5/17/2021 at 12:04 PM

## 2021-05-18 ENCOUNTER — APPOINTMENT (OUTPATIENT)
Dept: PSYCHIATRY | Age: 26
End: 2021-05-18
Payer: COMMERCIAL

## 2021-05-19 ENCOUNTER — CLINICAL DOCUMENTATION (OUTPATIENT)
Dept: PSYCHIATRY | Age: 26
End: 2021-05-19

## 2021-05-19 NOTE — DISCHARGE SUMMARY
[address]      Client Name: Shawn David  Date of Admission: 05/03/2021    Discharge Date: 05/19/2021      Diagnosis: F10.20, F41.10, F32.9    Authorized Person's Name: Fransisco Garnett [facility]          License: MS March LPC          Date: 5/19/2021      Degree of Severity- ADMISSION  Degree of Severity- DISCHARGE    Acute Intoxication withdrawal moderate Acute Intoxication withdrawal low   Biomedical Conditions/  Complications low Biomedical Conditions/  Complications low   Emotional Behavioral/ Cognitive Conditions moderate Emotional Behavioral/ Cognitive Conditions moderate   Treatment Acceptance Resistance moderate Treatment Acceptance Resistance low   Relapse Potential moderate Relapse Potential moderate   Recovery Environment moderate Recovery Environment moderate       Comments on Dimensional Criteria: During her time in treatment, pt did show improved treatment acceptance and began exhibiting stronger motivation for change. Most criteria did not change in acuity, as the pt was unable to continue treatment at this provider due to insurance issues. Level of Care and Service Provided during Course of Treatment: Level 2.1 Intensive Outpatient Programming - Dual Diagnosis, Medication Management    Client's Response to Treatment: Pt was very enthusiastic about her participation in IOP throughout her time with this group. The pt was very insightful and showed improved coping abilities in the short time she was in treatment. Pt remains motivated despite having to leave due to insurance issues. Recommendations and/or Referral for Additional AOD Addiction Treatment or other services: Pt is recommended to complete Level 2.1 Intensive Outpatient Programming, preferably in a dual-diagnosis appropriate setting. Pt states she is agreeable and has a provider set up to follow up with in [place] where she is relocating.     Electronically signed [facility] on 5/19/2021 at 1:44 PM

## 2021-05-20 ENCOUNTER — APPOINTMENT (OUTPATIENT)
Dept: PSYCHIATRY | Age: 26
End: 2021-05-20
Payer: COMMERCIAL

## 2021-05-20 RX ORDER — CITALOPRAM 20 MG/1
20 TABLET ORAL DAILY
Qty: 30 TABLET | Refills: 0 | Status: SHIPPED | OUTPATIENT
Start: 2021-05-20 | End: 2021-06-18

## 2021-05-24 ENCOUNTER — APPOINTMENT (OUTPATIENT)
Dept: PSYCHIATRY | Age: 26
End: 2021-05-24
Payer: COMMERCIAL

## 2021-05-25 ENCOUNTER — APPOINTMENT (OUTPATIENT)
Dept: PSYCHIATRY | Age: 26
End: 2021-05-25
Payer: COMMERCIAL

## 2021-05-27 ENCOUNTER — APPOINTMENT (OUTPATIENT)
Dept: PSYCHIATRY | Age: 26
End: 2021-05-27
Payer: COMMERCIAL

## 2021-05-31 ENCOUNTER — APPOINTMENT (OUTPATIENT)
Dept: PSYCHIATRY | Age: 26
End: 2021-05-31
Payer: COMMERCIAL